# Patient Record
Sex: MALE | Race: ASIAN | NOT HISPANIC OR LATINO | Employment: OTHER | URBAN - METROPOLITAN AREA
[De-identification: names, ages, dates, MRNs, and addresses within clinical notes are randomized per-mention and may not be internally consistent; named-entity substitution may affect disease eponyms.]

---

## 2018-09-07 ENCOUNTER — RECORDS - HEALTHEAST (OUTPATIENT)
Dept: LAB | Facility: CLINIC | Age: 58
End: 2018-09-07

## 2018-09-07 LAB
ALBUMIN SERPL-MCNC: 3.7 G/DL (ref 3.5–5)
ALP SERPL-CCNC: 77 U/L (ref 45–120)
ALT SERPL W P-5'-P-CCNC: 50 U/L (ref 0–45)
ANION GAP SERPL CALCULATED.3IONS-SCNC: 10 MMOL/L (ref 5–18)
AST SERPL W P-5'-P-CCNC: 20 U/L (ref 0–40)
BILIRUB SERPL-MCNC: 0.8 MG/DL (ref 0–1)
BUN SERPL-MCNC: 24 MG/DL (ref 8–22)
CALCIUM SERPL-MCNC: 9.8 MG/DL (ref 8.5–10.5)
CHLORIDE BLD-SCNC: 105 MMOL/L (ref 98–107)
CHOLEST SERPL-MCNC: 278 MG/DL
CO2 SERPL-SCNC: 27 MMOL/L (ref 22–31)
CREAT SERPL-MCNC: 0.79 MG/DL (ref 0.7–1.3)
CREAT UR-MCNC: 54.6 MG/DL
FASTING STATUS PATIENT QL REPORTED: YES
GFR SERPL CREATININE-BSD FRML MDRD: >60 ML/MIN/1.73M2
GLUCOSE BLD-MCNC: 127 MG/DL (ref 70–125)
HDLC SERPL-MCNC: 57 MG/DL
LDLC SERPL CALC-MCNC: 198 MG/DL
MICROALBUMIN UR-MCNC: 0.55 MG/DL (ref 0–1.99)
MICROALBUMIN/CREAT UR: 10.1 MG/G
POTASSIUM BLD-SCNC: 5 MMOL/L (ref 3.5–5)
PROT SERPL-MCNC: 7 G/DL (ref 6–8)
SODIUM SERPL-SCNC: 142 MMOL/L (ref 136–145)
TRIGL SERPL-MCNC: 113 MG/DL

## 2019-02-18 ENCOUNTER — RECORDS - HEALTHEAST (OUTPATIENT)
Dept: ADMINISTRATIVE | Facility: OTHER | Age: 59
End: 2019-02-18

## 2019-04-22 ENCOUNTER — RECORDS - HEALTHEAST (OUTPATIENT)
Dept: ADMINISTRATIVE | Facility: OTHER | Age: 59
End: 2019-04-22

## 2019-08-14 ENCOUNTER — RECORDS - HEALTHEAST (OUTPATIENT)
Dept: ADMINISTRATIVE | Facility: OTHER | Age: 59
End: 2019-08-14

## 2019-08-15 ENCOUNTER — COMMUNICATION - HEALTHEAST (OUTPATIENT)
Dept: PULMONOLOGY | Facility: OTHER | Age: 59
End: 2019-08-15

## 2019-08-15 ENCOUNTER — AMBULATORY - HEALTHEAST (OUTPATIENT)
Dept: PULMONOLOGY | Facility: OTHER | Age: 59
End: 2019-08-15

## 2019-08-15 DIAGNOSIS — R06.02 SOB (SHORTNESS OF BREATH): ICD-10-CM

## 2019-09-26 RX ORDER — CETIRIZINE HYDROCHLORIDE 10 MG/1
10 TABLET ORAL DAILY
Status: SHIPPED | COMMUNITY
Start: 2019-09-26

## 2019-09-26 RX ORDER — ALBUTEROL SULFATE 90 UG/1
2 AEROSOL, METERED RESPIRATORY (INHALATION) EVERY 4 HOURS PRN
Status: SHIPPED | COMMUNITY
Start: 2019-09-26

## 2019-10-02 ENCOUNTER — RECORDS - HEALTHEAST (OUTPATIENT)
Dept: PULMONOLOGY | Facility: OTHER | Age: 59
End: 2019-10-02

## 2019-10-02 ENCOUNTER — RECORDS - HEALTHEAST (OUTPATIENT)
Dept: ADMINISTRATIVE | Facility: OTHER | Age: 59
End: 2019-10-02

## 2019-10-02 ENCOUNTER — OFFICE VISIT - HEALTHEAST (OUTPATIENT)
Dept: PULMONOLOGY | Facility: OTHER | Age: 59
End: 2019-10-02

## 2019-10-02 DIAGNOSIS — J45.20 MILD INTERMITTENT ASTHMA WITHOUT COMPLICATION: ICD-10-CM

## 2019-10-02 DIAGNOSIS — J30.9 ALLERGIC RHINITIS, UNSPECIFIED SEASONALITY, UNSPECIFIED TRIGGER: ICD-10-CM

## 2019-10-02 DIAGNOSIS — R06.02 SHORTNESS OF BREATH: ICD-10-CM

## 2019-10-02 DIAGNOSIS — R06.09 DYSPNEA ON EXERTION: ICD-10-CM

## 2019-10-02 ASSESSMENT — MIFFLIN-ST. JEOR: SCORE: 1355.57

## 2019-10-25 ENCOUNTER — RECORDS - HEALTHEAST (OUTPATIENT)
Dept: LAB | Facility: CLINIC | Age: 59
End: 2019-10-25

## 2019-10-25 LAB
ALBUMIN SERPL-MCNC: 3.7 G/DL (ref 3.5–5)
ALP SERPL-CCNC: 87 U/L (ref 45–120)
ALT SERPL W P-5'-P-CCNC: 32 U/L (ref 0–45)
ANION GAP SERPL CALCULATED.3IONS-SCNC: 10 MMOL/L (ref 5–18)
AST SERPL W P-5'-P-CCNC: 19 U/L (ref 0–40)
BILIRUB SERPL-MCNC: 0.9 MG/DL (ref 0–1)
BUN SERPL-MCNC: 21 MG/DL (ref 8–22)
CALCIUM SERPL-MCNC: 9.1 MG/DL (ref 8.5–10.5)
CHLORIDE BLD-SCNC: 106 MMOL/L (ref 98–107)
CHOLEST SERPL-MCNC: 198 MG/DL
CO2 SERPL-SCNC: 24 MMOL/L (ref 22–31)
CREAT SERPL-MCNC: 0.81 MG/DL (ref 0.7–1.3)
FASTING STATUS PATIENT QL REPORTED: ABNORMAL
GFR SERPL CREATININE-BSD FRML MDRD: >60 ML/MIN/1.73M2
GLUCOSE BLD-MCNC: 114 MG/DL (ref 70–125)
HDLC SERPL-MCNC: 42 MG/DL
LDLC SERPL CALC-MCNC: 120 MG/DL
POTASSIUM BLD-SCNC: 3.8 MMOL/L (ref 3.5–5)
PROT SERPL-MCNC: 6.9 G/DL (ref 6–8)
SODIUM SERPL-SCNC: 140 MMOL/L (ref 136–145)
TRIGL SERPL-MCNC: 180 MG/DL

## 2020-05-11 ENCOUNTER — RECORDS - HEALTHEAST (OUTPATIENT)
Dept: MEDSURG UNIT | Facility: CLINIC | Age: 60
End: 2020-05-11

## 2020-05-11 ENCOUNTER — ANESTHESIA - HEALTHEAST (OUTPATIENT)
Dept: MEDSURG UNIT | Facility: CLINIC | Age: 60
End: 2020-05-11

## 2021-06-01 NOTE — PROGRESS NOTES
[facility] Pulmonary Clinic Visit    Problems Addressed Today  Problem List Items Addressed This Visit     None      Visit Diagnoses     Dyspnea on exertion    -  Primary    Mild intermittent asthma without complication        Relevant Medications    cetirizine (ZYRTEC) 10 MG tablet    albuterol (PROAIR HFA;PROVENTIL HFA;VENTOLIN HFA) 90 mcg/actuation inhaler    Allergic rhinitis, unspecified seasonality, unspecified trigger        Relevant Medications    cetirizine (ZYRTEC) 10 MG tablet    meloxicam (MOBIC) 15 MG tablet    albuterol (PROAIR HFA;PROVENTIL HFA;VENTOLIN HFA) 90 mcg/actuation inhaler        Assessment:58M with diabetes here with shortness of breath, exertional that improves with ventolin.  This could represent deconditioning or exercise induced asthma.    Plans and recommendations:   Mild exercise induced asthma: continue ventolin as needed.  Treat underlying allergies. Action plan given, follow up as needed.    He will need to be observed for signs/symptoms concerning for cardiac issues given his risk factors.    New problem with no further workup  Old problem stable.    Chief Complaint:shortness of breath    HPI:Shortness of breath.  Started 1 year ago.  It is worse with exertion.  It improves with rest.  Localizes to the chest.  No other provocative or palliative factors.  No sputum production, no hemoptysis, no reflux.  He does have sinus symptoms and he takes a medication which helps.    It is only associated with heavy exertion - going up stairs carrying things.    He denies chest pain, heaviness, weight.  No diaphoresis or dizziness.    Current Outpatient Medications on File Prior to Visit   Medication Sig Dispense Refill     acetaminophen (TYLENOL) 500 MG tablet Take 500 mg by mouth every 6 (six) hours as needed.       albuterol (PROAIR HFA;PROVENTIL HFA;VENTOLIN HFA) 90 mcg/actuation inhaler Inhale 2 puffs every 6 (six) hours as needed.       amLODIPine-atorvastatin (CADUET) 5-40 mg per  "tablet Take 1 tablet by mouth daily.       aspirin 81 MG EC tablet Take 81 mg by mouth daily.       cetirizine (ZYRTEC) 10 MG tablet Take 10 mg by mouth daily.       FLUoxetine (PROZAC) 20 MG capsule Take 20 mg by mouth daily.       hydroCHLOROthiazide (HYDRODIURIL) 12.5 MG tablet Take 12.5 mg by mouth daily.       lisinopril (PRINIVIL,ZESTRIL) 40 MG tablet Take 40 mg by mouth daily.       meloxicam (MOBIC) 15 MG tablet Take 15 mg by mouth daily.       simvastatin (ZOCOR) 40 MG tablet Take 40 mg by mouth at bedtime.       traZODone (DESYREL) 50 MG tablet Take 50 mg by mouth at bedtime.       No current facility-administered medications on file prior to visit.          Medical History  There are no active non-hospital problems to display for this patient.    No past medical history on file.     Surgical History  He  has no past surgical history on file.       Social History  Reviewed, and he  reports that he has never smoked. He has never used smokeless tobacco.       Allergies  No Known Allergies Family History  Reviewed, and no asthma history or copd history.          /70   Pulse 74   Resp 12   Ht 5' 2\" (1.575 m)   Wt 148 lb (67.1 kg)   SpO2 93%   BMI 27.07 kg/m    General: calm, normal body habitus  Eyes: no scleral injection or icterus, pupils equal and round  Nose: patent nares  Mouth: oropharynx benign, MP3  Neck: supple, no lymph nodes  Chest: nontender to palpation, no deformity  Lungs: clear no wheezes or crackles  CV: palpable radial pulses, RRR, no m/r/g, normal PMI  Abd: soft, nontender  Ext: no clubbing, no ulnar deviation of digits  Neuro: alert and interactive, no tremor or abnormal movements  Pscyh: normal affect appropriate to clinical scenario, no hallucination      Data Review - imaging, labs, and ekgs listed below were reviewed by me.  Chest XRay and chest CT images and EKG tracings interpreted personally.     Past Labs  Ancillary Procedure on 10/02/2019   Component Date Value     Hgb " 10/02/2019 15.5        * Cannot find OR log *    PFTs reviewed and interpreted by me:  Normal spirometry. Normal lung volumes, normal diffusion capacity.      Outside records reviewed and interpreted by me:  Shortness of breath with exertional cause. Allergies are an issue but respond to antihistamine. No spirometry but does respond to ventolin.  Has diabetes that is well controlled, hypertension.

## 2021-06-03 VITALS
HEART RATE: 74 BPM | RESPIRATION RATE: 12 BRPM | HEIGHT: 62 IN | DIASTOLIC BLOOD PRESSURE: 70 MMHG | BODY MASS INDEX: 27.23 KG/M2 | OXYGEN SATURATION: 93 % | WEIGHT: 148 LBS | SYSTOLIC BLOOD PRESSURE: 110 MMHG

## 2021-06-08 NOTE — ANESTHESIA PROCEDURE NOTES
Emergent Intubation    Date/Time: 5/11/2020 9:05 AM    CRNA: Bisi Glynn CRNA  Indications: respiratory distress    Medications Administered  PropofoL injection (DIPRIVAN), 50 mgMedication administration time:5/11/2020 9:05 AMRoute: oral  Technique: direct  Tube size: 8.5 mm  Tube type: cuffed  Cuff inflated: yes  Level of Difficulty: 0ETT to lip: 23 (tube advanced from 22 to 23 cm) cm  Tube secured with: ETT olivares  ETCO2 = Yes  Breath sounds: equal  SaO2 %: 96    Sign out given. CXR and sedation per primary care team.  Comments: Called to room for cuff leak and inadequate tidal volumes.  Upon direct visualization of ETT, tube had migrated out of place.  Cuff deflated, tube re-advanced from 22cm to 23cm under direct visualization.  Tube secured with ETT olivares and RT assistance.  Tidal volumes back to goal volumes after advancement of tube

## 2021-06-16 PROBLEM — F32.A DEPRESSION: Status: ACTIVE | Noted: 2020-06-08

## 2021-06-16 PROBLEM — R13.19 OTHER DYSPHAGIA: Status: ACTIVE | Noted: 2020-06-08

## 2021-06-16 PROBLEM — E11.9 TYPE 2 DIABETES MELLITUS WITHOUT COMPLICATION, WITHOUT LONG-TERM CURRENT USE OF INSULIN (H): Status: ACTIVE | Noted: 2020-06-08

## 2021-06-16 PROBLEM — J69.0 ASPIRATION PNEUMONIA (H): Status: ACTIVE | Noted: 2020-05-09

## 2021-06-16 PROBLEM — G37.9 DEMYELINATING DISEASE (H): Status: ACTIVE | Noted: 2020-06-19

## 2021-06-16 PROBLEM — G93.41 ACUTE METABOLIC ENCEPHALOPATHY: Status: ACTIVE | Noted: 2020-06-13

## 2021-06-16 PROBLEM — I10 ESSENTIAL HYPERTENSION: Chronic | Status: ACTIVE | Noted: 2020-06-08

## 2021-06-19 NOTE — LETTER
Letter by Steven Garcia MD at      Author: Steven Garcia MD Service: -- Author Type: --    Filed:  Encounter Date: 8/15/2019 Status: (Other)         Gabriel Colorado  [address]  [address]  [facility] [address]    August 15, 2019    Dear Mr. Colorado,    Welcome to [facility]! Your appointment information is below.   Please bring the following to your appointment:    Insurance Card, so we may scan it for our records    Drivers license or valid ID, so we may scan it for our records    Co-pay (as applicable per your insurance plan)    A current list of your medications including over the counter products such as vitamins and supplements    Your medical records including copies of X-Ray films if you are transferring your care from another clinic.  If you do not have your records, please fill out the release of information form and we will request those records.     Provider: Steven Garcia MD  Appointment Date:  Wednesday, October 2, 2019  Arrival Time:  8:00 PFT,  9:00 dr appt.    Location: [address]        [facility], [address]    **Please allow adequate time for your commute and parking. If you are more than 10 minutes late, you may be asked to reschedule.     If you need to cancel or reschedule your appointment, please notify us at least 24 hours prior to your appointment time so we are able to make this time available for another patient.    Thank you for choosing the [facility] for your health care needs. If you have any questions, please do not hesitate to contact us at any time at   [phone]. We look forward to caring for you.     Sincerely,     [facility] staff

## 2021-07-14 PROBLEM — J96.01 ACUTE RESPIRATORY FAILURE WITH HYPOXIA (H): Status: RESOLVED | Noted: 2020-05-09 | Resolved: 2020-06-19

## 2022-09-01 ENCOUNTER — TELEPHONE (OUTPATIENT)
Dept: AUDIOLOGY | Facility: CLINIC | Age: 62
End: 2022-09-01

## 2022-09-01 ENCOUNTER — TRANSFERRED RECORDS (OUTPATIENT)
Dept: HEALTH INFORMATION MANAGEMENT | Facility: CLINIC | Age: 62
End: 2022-09-01

## 2022-09-01 ENCOUNTER — TRANSCRIBE ORDERS (OUTPATIENT)
Dept: OTHER | Age: 62
End: 2022-09-01

## 2022-09-01 ENCOUNTER — MEDICAL CORRESPONDENCE (OUTPATIENT)
Dept: HEALTH INFORMATION MANAGEMENT | Facility: CLINIC | Age: 62
End: 2022-09-01

## 2022-09-01 DIAGNOSIS — H91.92 UNSPECIFIED HEARING LOSS, LEFT EAR: Primary | ICD-10-CM

## 2022-09-01 NOTE — PROGRESS NOTES
AUDIOLOGY REPORT    SUBJECTIVE: Gabriel Colorado is a 61 year old male who was seen in the Audiology Clinic at the [facility] for audiologic evaluation, referred by, Janell Mcgill MD. They were accompanied today by their wife and . Patient reports he does not hear much out of the left ear, and has some hearing loss in the right ear as well. He arrived wearing Siemens/[employer] hearing aids, fit at an outside clinic, he reports they are about 4 years old. He is interested in having the hearing aids looked over. He reports non-pulsatile tinnitus in the right ear only. Has vertigo and dizziness 2-3 times a week. He denied otalgia, otorrhea, loud noise exposure, and history of ear surgery.    OBJECTIVE:  Abuse Screening:  Do you feel unsafe at home or work/school? No  Do you feel threatened by someone? No  Does anyone try to keep you from having contact with others, or doing things outside of your home? No  Physical signs of abuse present? No     Fall Risk Screen:  1. Have you fallen two or more times in the past year? No  2. Have you fallen and had an injury in the past year? No    Timed Up and Go Score (in seconds): not tested  Is patient a fall risk? No  Referral initiated: No  Fall Risk Assessment Completed by Audiology    Otoscopic exam indicates ears are clear of cerumen bilaterally     Pure Tone Thresholds assessed using conventional audiometry with good reliability from 250-8000 Hz bilaterally using insert earphones and circumaural headphones.     RIGHT:  Moderate to moderately severe sensorineural (SNHL). Masking dilemma at 250 Hz for bone conduction.     LEFT:  Profound rising to severe mixed hearing loss.    Tympanogram:    RIGHT: Shallow mobility    LEFT:   Restricted eardrum mobility     Reflexes (reported by stimulus ear):  Did not test due to abnormal tympanograms.     Speech Detection Threshold:    RIGHT: 55 dB HL    LEFT:   90 dB HL    Word Recognition Score:     RIGHT: 90%  at 85 dB HL using 10 word Hmong list presented via .     LEFT:  0% at 90 dB HL  using 10 word Hmong list presented via .     Lila: Negative.     ASSESSMENT: Today's results reveal a moderate to moderately severe SNHL in the right ear, and a profound rising to severe mixed hearing loss in the left ear. Today s results were discussed with the patient in detail.     Patient left his hearing aids and  to be cleaned and checked. Replaced domes and vacuumed microphone ports. Unable to replace wax traps due to not having the correct wax traps in clinic. Listening check confirmed proper function of the hearing aids.     PLAN: Follow up with ENT due to asymmetric hearing loss, abnormal tympanograms, vertigo, unilateral tinnitus, and to obtain medical clearance for new hearing aids. Audiology assistnat will call Gabriel and let him the know the hearing aids are ready to be picked up. Please call this clinic with questions regarding these results or recommendations.    Ann-Marie Reed., CCC-A  [place] Licensed Audiologist #[id number]

## 2022-09-01 NOTE — TELEPHONE ENCOUNTER
Called and spoke with patient using [employer] . He has an audiology appointment for a hearing evaluation scheduled for 9/6/2022.  Let him know that his insurance requires an order/referral to be seen by audiology for this type of appointment. His PCP is not apart of [employer], unable to request an order via in basket messaging. Patient will call his PCP and request an order, provided patient with the fax number. He is aware without an order there would be an out of pocket expense for testing.     Chaim Reed, CCC-A  [place] Licensed Audiologist #[id number]

## 2022-09-06 ENCOUNTER — OFFICE VISIT (OUTPATIENT)
Dept: AUDIOLOGY | Facility: CLINIC | Age: 62
End: 2022-09-06
Attending: FAMILY MEDICINE
Payer: MEDICARE

## 2022-09-06 DIAGNOSIS — H91.92 UNSPECIFIED HEARING LOSS, LEFT EAR: ICD-10-CM

## 2022-09-06 DIAGNOSIS — H90.A21 SENSORINEURAL HEARING LOSS (SNHL) OF RIGHT EAR WITH RESTRICTED HEARING OF LEFT EAR: Primary | ICD-10-CM

## 2022-09-06 PROCEDURE — 92567 TYMPANOMETRY: CPT | Performed by: AUDIOLOGIST

## 2022-09-06 PROCEDURE — 92593 PR HEARING AID CHECK, BINAURAL: CPT | Performed by: AUDIOLOGIST

## 2022-09-06 PROCEDURE — 92565 STENGER TEST PURE TONE: CPT | Performed by: AUDIOLOGIST

## 2022-09-06 PROCEDURE — 92557 COMPREHENSIVE HEARING TEST: CPT | Performed by: AUDIOLOGIST

## 2022-10-21 ENCOUNTER — TELEPHONE (OUTPATIENT)
Dept: AUDIOLOGY | Facility: CLINIC | Age: 62
End: 2022-10-21

## 2022-10-21 NOTE — TELEPHONE ENCOUNTER
Patient saw Coral 9/6 and left his hearing aids to be looked at, he has not gotten a call that they are ready.  Please call patient and advise when he can  his hearing aids.    Thanks!

## 2022-10-25 ENCOUNTER — TELEPHONE (OUTPATIENT)
Dept: AUDIOLOGY | Facility: CLINIC | Age: 62
End: 2022-10-25

## 2022-10-25 NOTE — TELEPHONE ENCOUNTER
Called patient via  to let them know their hearing aids are ready for pickup at the [facility], hours were provided.    Patient was previously called on 9/6/22 that the hearing aids were ready for pickup. Gabriel stated that they will come collect them at the clinic today.     Judith Martinez, Audiology Clinic Assistant

## 2023-02-10 ENCOUNTER — OFFICE VISIT (OUTPATIENT)
Dept: OTOLARYNGOLOGY | Facility: CLINIC | Age: 63
End: 2023-02-10
Payer: MEDICARE

## 2023-02-10 DIAGNOSIS — Z46.1 HEARING AID FITTING OR ADJUSTMENT: Primary | ICD-10-CM

## 2023-02-10 NOTE — LETTER
2/10/2023         RE: Gabriel Colorado  [address] [place] [address]        Dear Colleague,    Thank you for referring your patient, Gabriel Colorado, to the [facility]. Please see a copy of my visit note below.    VISIT CANCELLED        Again, thank you for allowing me to participate in the care of your patient.        Sincerely,        Elbert Roberson MD

## 2023-03-31 NOTE — PROGRESS NOTES
AUDIOLOGY REPORT    SUBJECTIVE: Gabriel Colorado is a 62 year old male was seen in the Audiology Clinic at  [facility] on 4/10/23 to discuss concerns with hearing and functional communication difficulties. The patient was accompanied by their wife and an  today. Gabriel has been seen previously on 09/06/2022 and results revealed a moderate to moderately severe sensorineural hearing loss in the right ear, and a profound rising to severe mixed hearing loss in the left ear. The patient has not yet been medically cleared for hearing aids, he has an ENT appointment scheduled for 06/09/2023. His previous ENT appointment in February was cancelled in error. He would like to hear better at [employer] and at meetings. Patient currently wears binaural Siemens Cellion SHAWANDA style hearing aids, fit at an outside clinic.     OBJECTIVE:  Abuse Screening:  Do you feel unsafe at home or work/school? No  Do you feel threatened by someone? No  Does anyone try to keep you from having contact with others, or doing things outside of your home? No  Physical signs of abuse present? No    Patient is a hearing aid candidate. Patient would like to move forward with a hearing aid evaluation today. Therefore, the patient was presented with different options for amplification to help aid in communication. Discussed styles, levels of technology and monaural vs. binaural fitting.     Discussed the purpose of the CROS type hearing aid in detail. Discussed severity of patient's hearing loss and realistic expectations. Discussed attending the ENT appointment on 6/9/2023 for medical clearance, he is aware he will not be fit with hearing aids if he does not have medical clearance.       The hearing aid(s) mutually chosen were:  Binaural: Phonak Left: CROS P-R Right: [place] P-R 70  COLOR: Beige  BATTERY SIZE: rechargeable  EARMOLD/TIPS: Right cShell, Left: open dome  CANAL/ LENGTH: 1    Otoscopy revealed ears are clear of  cerumen. A right earmold was taken without incident.    ASSESSMENT:   Reviewed purchase information and warranty information with patient. The 45 day trial period was explained to patient. The patient was given a copy of the [place] [employer] consumer brochure on purchasing hearing instruments. Patient risk factors have been provided to the patient in writing prior to the sale of the hearing aid per FDA regulation. The risk factors are also available in the User Instructional Booklet to be presented on the day of the hearing aid fitting. Hearing aid(s) ordered. Hearing aid evaluation completed.    PLAN: Gabriel is scheduled to return on 06/27/2023 for a hearing aid fitting and programming appointment. ENT appointment on 06/09/2023 for medical clearance for hearing aids. Purchase agreement will be completed on that date. Please contact this clinic with any questions or concerns.      Chaim Reed, CCC-A  [place] Licensed Audiologist #[id number]

## 2023-04-10 ENCOUNTER — OFFICE VISIT (OUTPATIENT)
Dept: AUDIOLOGY | Facility: CLINIC | Age: 63
End: 2023-04-10
Payer: MEDICAID

## 2023-04-10 DIAGNOSIS — H90.A21 SENSORINEURAL HEARING LOSS (SNHL) OF RIGHT EAR WITH RESTRICTED HEARING OF LEFT EAR: Primary | ICD-10-CM

## 2023-04-10 PROCEDURE — V5275 EAR IMPRESSION: HCPCS | Mod: RT | Performed by: AUDIOLOGIST

## 2023-04-10 PROCEDURE — 92590 PR HEARING AID EXAM MONAURAL: CPT | Performed by: AUDIOLOGIST

## 2023-06-09 ENCOUNTER — OFFICE VISIT (OUTPATIENT)
Dept: OTOLARYNGOLOGY | Facility: CLINIC | Age: 63
End: 2023-06-09
Payer: MEDICARE

## 2023-06-09 DIAGNOSIS — H90.3 SENSORINEURAL HEARING LOSS (SNHL) OF BOTH EARS: Primary | ICD-10-CM

## 2023-06-09 PROCEDURE — 99203 OFFICE O/P NEW LOW 30 MIN: CPT | Performed by: OTOLARYNGOLOGY

## 2023-06-09 NOTE — LETTER
"    6/9/2023         RE: Gabriel Colorado  [address] [place] [address]        Dear Colleague,    Thank you for referring your patient, Gabriel Colorado, to the [facility]. Please see a copy of my visit note below.    CHIEF COMPLAINT: Patient presents with:  Hearing Problem: Pt needs medical clearance for hearing aids          HISTORY OF PRESENT ILLNESS    Gabriel was seen at the behest of Janell Mcgill MD for hearing loss.  No dizziness.  He was \"waterfall\" tinnitus in the right ear.  Current set of hearing aids 4 years old ([employer]).  He presents for medical clearance.     AUDIOLOGY NOTE: (6 months prior)     and patient s wife present. Patient reports he does not hear much out of the left ear, and has some hearing loss in the right  ear as well. He arrived wearing Siemens/Remit[employer] SHWAANDA hearing aids fit at an outside clinic, he reports they are about 4 years old. He is  interested in having the hearing aids looked over. He reports non-pulsatile tinnitus in the right ear only. Has vertigo and dizziness 2-3 times  a week. He denied otalgia, otorrhea, loud noise exposure, and history of ear surgery.  RESULTS: Otoscopy: Clear canals bilat. Tymps: Right: Shallow mobility. Left: Restricted eardrum mobility. Reflexes: DNT due to abnormal  tymps. Audio: Right: Moderate to moderately severe SNHL. Masking dilemma at 250 Hz for bone. Left: Profound rising to severe mixed  hearing loss. Word rec: 10 word Hmong list presented via . Right: 9/10. Left: 0/10. Negative Lila at multiple frequencies.  REC: Follow up with ENT due to asymmetric hearing loss, abnormal tympanograms, vertigo, unilateral tinnitus, and to obtain medical  clearance for new hearing aids. Patient left hearing aids to be cleaned and checked. Will call patient when ready for .     REVIEW OF SYSTEMS    Review of Systems as per HPI and PMHx, otherwise 10 system review system are negative. "       ALLERGIES    Vancomycin    CURRENT MEDICATIONS      Current Outpatient Medications:      albuterol (PROAIR HFA;PROVENTIL HFA;VENTOLIN HFA) 90 mcg/actuation inhaler, [ALBUTEROL (PROAIR HFA;PROVENTIL HFA;VENTOLIN HFA) 90 MCG/ACTUATION INHALER] Inhale 2 puffs every 4 (four) hours as needed. , Disp: , Rfl:      amLODIPine-benazepril (LOTREL) 5-40 mg per capsule, [AMLODIPINE-BENAZEPRIL (LOTREL) 5-40 MG PER CAPSULE] Take 1 capsule by mouth daily., Disp: , Rfl:      ARTHRITIS PAIN RELIEF, ACETAM, 650 mg CR tablet, [ARTHRITIS PAIN RELIEF, ACETAM, 650 MG CR TABLET] Take 650 mg by mouth every 8 (eight) hours as needed., Disp: , Rfl:      aspirin 81 MG EC tablet, [ASPIRIN 81 MG EC TABLET] Take 81 mg by mouth daily., Disp: , Rfl:      atorvastatin (LIPITOR) 40 MG tablet, [ATORVASTATIN (LIPITOR) 40 MG TABLET] Take 1 tablet (40 mg total) by mouth every evening., Disp: , Rfl: 0     cetirizine (ZYRTEC) 10 MG tablet, [CETIRIZINE (ZYRTEC) 10 MG TABLET] Take 10 mg by mouth daily., Disp: , Rfl:      metFORMIN (GLUCOPHAGE-XR) 500 MG 24 hr tablet, [METFORMIN (GLUCOPHAGE-XR) 500 MG 24 HR TABLET] Take 500 mg by mouth daily., Disp: , Rfl:      traZODone (DESYREL) 100 MG tablet, [TRAZODONE (DESYREL) 100 MG TABLET] Take 1 tablet (100 mg total) by mouth at bedtime as needed for sleep., Disp: , Rfl: 0     VITAMIN D3 50 mcg (2,000 unit) capsule, [VITAMIN D3 50 MCG (2,000 UNIT) CAPSULE] Take 2,000 Units by mouth daily., Disp: , Rfl:      PAST MEDICAL HISTORY    PAST MEDICAL HISTORY:   Past Medical History:   Diagnosis Date     Asthma      Depression      HTN (hypertension)        PAST SURGICAL HISTORY    PAST SURGICAL HISTORY:   Past Surgical History:   Procedure Laterality Date     IR LUMBAR PUNCTURE  6/14/2020     IR SPINAL PUNCTURE  6/14/2020     PICC INSERTION - TRIPLE LUMEN  5/9/2020            FAMILY  HISTORY    FAMILY HISTORY: No family history on file.    SOCIAL HISTORY    SOCIAL HISTORY:   Social History     Tobacco Use     Smoking  status: Not on file     Smokeless tobacco: Not on file   Vaping Use     Vaping status: Not on file   Substance Use Topics     Alcohol use: Not on file        PHYSICAL EXAM    HEAD: Normal appearance and symmetry:  No cutaneous lesions.      NECK:  supple     EARS:    Right:   TM intact   LEFT:   TM intact    EYES:  EOMI    CN VII/XII:  intact     NOSE:     Dorsum:   straight  Septum:  midline  Mucosa:  moist        ORAL CAVITY/OROPHARYNX:     Lips:  Normal.  Tongue: normal, midline  Mucosa:   no lesions     NECK:  Trachea:  midline.              Thyroid:  normal              Adenopathy:  none        NEURO:   Alert and Oriented     GAIT AND STATION:  normal     RESPIRATORY:   Symmetry and Respiratory effort     PSYCH:  Normal mood and affect     SKIN:   warm and dry     AUDIOGRAM:  Non serviceable hearing left ear  Profound SNHL right.     IMPRESSION:    Encounter Diagnosis   Name Primary?     Sensorineural hearing loss (SNHL) of both ears Yes          RECOMMENDATIONS:      This patient is medically cleared for hearing aids  Return annually for hearing test  Return immediately for any sudden change in hearing        Again, thank you for allowing me to participate in the care of your patient.        Sincerely,        Elbert Roberson MD

## 2023-06-09 NOTE — PROGRESS NOTES
"CHIEF COMPLAINT: Patient presents with:  Hearing Problem: Pt needs medical clearance for hearing aids          HISTORY OF PRESENT ILLNESS    Gabriel was seen at the behest of Janell Mcgill MD for hearing loss.  No dizziness.  He was \"waterfall\" tinnitus in the right ear.  Current set of hearing aids 4 years old ([employer]).  He presents for medical clearance.     AUDIOLOGY NOTE: (6 months prior)     and patient s wife present. Patient reports he does not hear much out of the left ear, and has some hearing loss in the right  ear as well. He arrived wearing Siemens/PerkHub HSAWANDA hearing aids fit at an outside clinic, he reports they are about 4 years old. He is  interested in having the hearing aids looked over. He reports non-pulsatile tinnitus in the right ear only. Has vertigo and dizziness 2-3 times  a week. He denied otalgia, otorrhea, loud noise exposure, and history of ear surgery.  RESULTS: Otoscopy: Clear canals bilat. Tymps: Right: Shallow mobility. Left: Restricted eardrum mobility. Reflexes: DNT due to abnormal  tymps. Audio: Right: Moderate to moderately severe SNHL. Masking dilemma at 250 Hz for bone. Left: Profound rising to severe mixed  hearing loss. Word rec: 10 word Hmong list presented via . Right: 9/10. Left: 0/10. Negative Lila at multiple frequencies.  REC: Follow up with ENT due to asymmetric hearing loss, abnormal tympanograms, vertigo, unilateral tinnitus, and to obtain medical  clearance for new hearing aids. Patient left hearing aids to be cleaned and checked. Will call patient when ready for .     REVIEW OF SYSTEMS    Review of Systems as per HPI and PMHx, otherwise 10 system review system are negative.       ALLERGIES    Vancomycin    CURRENT MEDICATIONS      Current Outpatient Medications:      albuterol (PROAIR HFA;PROVENTIL HFA;VENTOLIN HFA) 90 mcg/actuation inhaler, [ALBUTEROL (PROAIR HFA;PROVENTIL HFA;VENTOLIN HFA) 90 MCG/ACTUATION INHALER] Inhale 2 puffs every 4 " (four) hours as needed. , Disp: , Rfl:      amLODIPine-benazepril (LOTREL) 5-40 mg per capsule, [AMLODIPINE-BENAZEPRIL (LOTREL) 5-40 MG PER CAPSULE] Take 1 capsule by mouth daily., Disp: , Rfl:      ARTHRITIS PAIN RELIEF, ACETAM, 650 mg CR tablet, [ARTHRITIS PAIN RELIEF, ACETAM, 650 MG CR TABLET] Take 650 mg by mouth every 8 (eight) hours as needed., Disp: , Rfl:      aspirin 81 MG EC tablet, [ASPIRIN 81 MG EC TABLET] Take 81 mg by mouth daily., Disp: , Rfl:      atorvastatin (LIPITOR) 40 MG tablet, [ATORVASTATIN (LIPITOR) 40 MG TABLET] Take 1 tablet (40 mg total) by mouth every evening., Disp: , Rfl: 0     cetirizine (ZYRTEC) 10 MG tablet, [CETIRIZINE (ZYRTEC) 10 MG TABLET] Take 10 mg by mouth daily., Disp: , Rfl:      metFORMIN (GLUCOPHAGE-XR) 500 MG 24 hr tablet, [METFORMIN (GLUCOPHAGE-XR) 500 MG 24 HR TABLET] Take 500 mg by mouth daily., Disp: , Rfl:      traZODone (DESYREL) 100 MG tablet, [TRAZODONE (DESYREL) 100 MG TABLET] Take 1 tablet (100 mg total) by mouth at bedtime as needed for sleep., Disp: , Rfl: 0     VITAMIN D3 50 mcg (2,000 unit) capsule, [VITAMIN D3 50 MCG (2,000 UNIT) CAPSULE] Take 2,000 Units by mouth daily., Disp: , Rfl:      PAST MEDICAL HISTORY    PAST MEDICAL HISTORY:   Past Medical History:   Diagnosis Date     Asthma      Depression      HTN (hypertension)        PAST SURGICAL HISTORY    PAST SURGICAL HISTORY:   Past Surgical History:   Procedure Laterality Date     IR LUMBAR PUNCTURE  6/14/2020     IR SPINAL PUNCTURE  6/14/2020     PICC INSERTION - TRIPLE LUMEN  5/9/2020            FAMILY  HISTORY    FAMILY HISTORY: No family history on file.    SOCIAL HISTORY    SOCIAL HISTORY:   Social History     Tobacco Use     Smoking status: Not on file     Smokeless tobacco: Not on file   Vaping Use     Vaping status: Not on file   Substance Use Topics     Alcohol use: Not on file        PHYSICAL EXAM    HEAD: Normal appearance and symmetry:  No cutaneous lesions.      NECK:  supple     EARS:     Right:   TM intact   LEFT:   TM intact    EYES:  EOMI    CN VII/XII:  intact     NOSE:     Dorsum:   straight  Septum:  midline  Mucosa:  moist        ORAL CAVITY/OROPHARYNX:     Lips:  Normal.  Tongue: normal, midline  Mucosa:   no lesions     NECK:  Trachea:  midline.              Thyroid:  normal              Adenopathy:  none        NEURO:   Alert and Oriented     GAIT AND STATION:  normal     RESPIRATORY:   Symmetry and Respiratory effort     PSYCH:  Normal mood and affect     SKIN:   warm and dry     AUDIOGRAM:  Non serviceable hearing left ear  Profound SNHL right.     IMPRESSION:    Encounter Diagnosis   Name Primary?     Sensorineural hearing loss (SNHL) of both ears Yes          RECOMMENDATIONS:      This patient is medically cleared for hearing aids  Return annually for hearing test  Return immediately for any sudden change in hearing

## 2023-06-21 NOTE — PROGRESS NOTES
AUDIOLOGY REPORT    SUBJECTIVE: Gabriel Colorado, a 62 year old male, was seen in the Audiology Clinic at [facility] today for a Binaural hearing aid fitting. He was accompanied by his wife and an  at the visit today. Gabriel last had his hearing assessed at this clinic on 09/06/22 and results revealed moderate to moderately-severe sensorineural hearing loss in the right ear and profound rising to severe mixed hearing loss in the left ear. Gabriel was previously fit with a pair of Siemens/[employer] SHAWANDA hearing aids at an outside clinic. The patient was given medical clearance to pursue amplification by  Elbert Roberson M.D. on 6/9/23.      OBJECTIVE:  Prior to fitting, a hearing aid check was performed to ensure device functionality. The hearing aid conformity evaluation was completed.The hearing aids were placed and they provided a good fit. Real-ear-probe-microphone measurements were completed on the [employer] system and were a good match to NAL-NL2 target with soft sounds audible, moderate sounds comfortable, and loud sounds below discomfort. UCLs are verified through maximum power output measures and demonstrate appropriate limiting of loud inputs. Mr. Colorado was oriented to proper hearing aid use, care, cleaning (no water, dry brush), batteries (rechargeable, toxicity, low-battery signal), aid insertion/removal, user booklet, warranty information, storage cases, and other hearing aid details. The patient confirmed understanding of hearing aid use and care, and showed proper insertion of hearing aid and batteries while in the office today. Mr. Colorado reported good volume and sound quality today.    EAR(S) FIT: Binaural  MA HEARING AID MAKE: Right: Phonak Audeo P70-R, beige; Left: Phonak Cros P-R, beige    MA HEARING AID MODEL #: Right: [id number]; Left: [id number]  HEARING AID STYLE: Right: SHAWANDA; Left: SHAWANDA    LENGTH: Right:  1P; Left:  1C  EARMOLDS: Right: cShell: [id number]; Left: small  open dome with retention cord    SERIAL NUMBERS: Right: [id number]; Left: [id number]  WARRANTY END DATE: Right: 9/3/2026; Left:: 9/3/2026    Speech Intelligibility Index (SII)  The speech intelligibility index (SII) estimates the amount of audible speech at different presentation levels through the hearing aids. The following SII values represent audibility for average inputs:  Unaided SII:    3(R)  Aided SII:        44(R)    The overall gain of the right hearing aid is set to 100% for patient comfort. SoundRecover is disabled. Manual volume control is enabled on the right hearing aid. Manual CROS balance is enabled on the left CROS device. Gabriel is shown how to use both the volume control and CROS balance.     The hearing aids have not yet been paired to a phone. Gabriel hasn't been shown how to change the wax trap on the right hearing aid yet.       (The patient Does Not Require a signed a waiver that is on file agreeing to the upgrade charge, per their insurance contract. )        ASSESSMENT: Right Phonak Audeo P70-R and left Phonak CROS P-R hearing aid fitting completed today. Verification measures were performed. The 45 day trial period was explained to patient, and they expressed understanding. Mr. Colorado signed the Hearing Aid Purchase Agreement and was given a copy, as well as details on his hearing aids. Patient was counseled that exact out of pocket amounts cannot be determined for hearing aid claims being sent to insurance. Any insurance coverage information presented to the patient is an estimate only, and is not a guarantee of payment. Patient has been advised to check with their own insurance.    PLAN: Mr. Colorado will return for follow-up in 3 weeks for a hearing aid review appointment. Gabriel should be shown how to change the wax trap on his right hearing aid at the follow-up appointment. His hearing aid may also be paired to his phone at that time if he is interested. Please call this clinic with questions  regarding today s appointment.    Chaim Combs, [facility]  [place] Licensed Audiologist #[id number]

## 2023-06-27 ENCOUNTER — OFFICE VISIT (OUTPATIENT)
Dept: AUDIOLOGY | Facility: CLINIC | Age: 63
End: 2023-06-27
Payer: MEDICARE

## 2023-06-27 DIAGNOSIS — H90.A21 SENSORINEURAL HEARING LOSS (SNHL) OF RIGHT EAR WITH RESTRICTED HEARING OF LEFT EAR: ICD-10-CM

## 2023-06-27 DIAGNOSIS — H90.A32 MIXED CONDUCTIVE AND SENSORINEURAL HEARING LOSS OF LEFT EAR WITH RESTRICTED HEARING OF RIGHT EAR: ICD-10-CM

## 2023-06-27 PROCEDURE — 92592 PR HEARING AID CHECK, MONAURAL: CPT | Performed by: AUDIOLOGIST

## 2023-06-27 PROCEDURE — V5221 HEARING AID BINAURAL BTE/BTE: HCPCS | Mod: GY | Performed by: AUDIOLOGIST

## 2023-06-27 PROCEDURE — V5020 CONFORMITY EVALUATION: HCPCS | Performed by: AUDIOLOGIST

## 2023-06-27 PROCEDURE — V5240 DISP FEE CONTRALATERAL BINAU: HCPCS | Performed by: AUDIOLOGIST

## 2023-06-27 PROCEDURE — V5264 EAR MOLD/INSERT: HCPCS | Mod: RT | Performed by: AUDIOLOGIST

## 2023-06-27 PROCEDURE — V5011 HEARING AID FITTING/CHECKING: HCPCS | Performed by: AUDIOLOGIST

## 2024-06-07 NOTE — PROGRESS NOTES
AUDIOLOGY REPORT     SUMMARY: Audiology visit completed. See audiogram for results.       RECOMMENDATIONS: Follow-up with ENT.    hCaim Wild, [facility]  [place] Licensed Audiologist #[id number]

## 2024-06-10 ENCOUNTER — OFFICE VISIT (OUTPATIENT)
Dept: OTOLARYNGOLOGY | Facility: CLINIC | Age: 64
End: 2024-06-10
Payer: MEDICARE

## 2024-06-10 ENCOUNTER — OFFICE VISIT (OUTPATIENT)
Dept: AUDIOLOGY | Facility: CLINIC | Age: 64
End: 2024-06-10
Payer: MEDICARE

## 2024-06-10 DIAGNOSIS — H90.3 SNHL (SENSORY-NEURAL HEARING LOSS), ASYMMETRICAL: Primary | ICD-10-CM

## 2024-06-10 DIAGNOSIS — H90.A21 SENSORINEURAL HEARING LOSS (SNHL) OF RIGHT EAR WITH RESTRICTED HEARING OF LEFT EAR: Primary | ICD-10-CM

## 2024-06-10 DIAGNOSIS — H91.92 UNSPECIFIED HEARING LOSS, LEFT EAR: ICD-10-CM

## 2024-06-10 PROCEDURE — 92550 TYMPANOMETRY & REFLEX THRESH: CPT | Performed by: AUDIOLOGIST

## 2024-06-10 PROCEDURE — 92557 COMPREHENSIVE HEARING TEST: CPT | Mod: 52 | Performed by: AUDIOLOGIST

## 2024-06-10 PROCEDURE — 99214 OFFICE O/P EST MOD 30 MIN: CPT | Performed by: OTOLARYNGOLOGY

## 2024-06-10 NOTE — LETTER
6/10/2024      Gabriel Colorado  Please Check Full Address Mail Returned  As Undeliverable   [address] [place] [address]      Dear Colleague,    Thank you for referring your patient, Gabriel Colorado, to the [facility]. Please see a copy of my visit note below.    FOLLOW UP VISIT NOTE      HISTORY OF PRESENT ILLNESS    Gabriel was seen in follow up after previous 6/9/2023 visit for audiology review.  Patient has noted decrease in hearing in the right ear over that past few years.   He has difficulty in large open spaeces (such as [employer]), where the sounds reverberate.   He wears hearing aids dispensed by our clinic that are approximately 1 year old.         REVIEW OF SYSTEMS    Review of Systems: a 10-system review is reviewed at this encounter.  See scanned document.       Allergies   Allergen Reactions     Vancomycin Rash           PHYSICAL EXAM:        HEAD: Normal appearance and symmetry:  No cutaneous lesions.      EARS:        RIGHT: TM intact   LEFT:   TM intact  NOSE:    Dorsum:   straight       ORAL CAVITY/OROPHARYNX:    Lips:  Normal.     NECK:  Trachea:  midline     NEURO:   Alert and Oriented    GAIT AND STATION:  normal     RESPIRATORY:   Symmetry and Respiratory effort    PSYCH:   normal mood and affect    SKIN:  warm and dry     AUDIOGRAM: slightly worse tresholds RIGHT ear with 27% WR (previously 90%)      IMPRESSION:   Encounter Diagnosis   Name Primary?     SNHL (sensory-neural hearing loss), asymmetrical Yes            RECOMMENDATIONS:    Refer to audiology for hearing aid adjustment  Recheck hearing in 6 months.   Discussed option of Cochlear implantation with patient, he will take it under consideration.   Counseling time 15min/25 minute visit.       Again, thank you for allowing me to participate in the care of your patient.        Sincerely,        Elbert Roberson MD

## 2024-06-10 NOTE — PROGRESS NOTES
FOLLOW UP VISIT NOTE      HISTORY OF PRESENT ILLNESS    Gabriel was seen in follow up after previous 6/9/2023 visit for audiology review.  Patient has noted decrease in hearing in the right ear over that past few years.   He has difficulty in large open spaeces (such as [employer]), where the sounds reverberate.   He wears hearing aids dispensed by our clinic that are approximately 1 year old.         REVIEW OF SYSTEMS    Review of Systems: a 10-system review is reviewed at this encounter.  See scanned document.       Allergies   Allergen Reactions    Vancomycin Rash           PHYSICAL EXAM:        HEAD: Normal appearance and symmetry:  No cutaneous lesions.      EARS:        RIGHT: TM intact   LEFT:   TM intact  NOSE:    Dorsum:   straight       ORAL CAVITY/OROPHARYNX:    Lips:  Normal.     NECK:  Trachea:  midline     NEURO:   Alert and Oriented    GAIT AND STATION:  normal     RESPIRATORY:   Symmetry and Respiratory effort    PSYCH:   normal mood and affect    SKIN:  warm and dry     AUDIOGRAM: slightly worse tresholds RIGHT ear with 27% WR (previously 90%)      IMPRESSION:   Encounter Diagnosis   Name Primary?    SNHL (sensory-neural hearing loss), asymmetrical Yes            RECOMMENDATIONS:    Refer to audiology for hearing aid adjustment  Recheck hearing in 6 months.   Discussed option of Cochlear implantation with patient, he will take it under consideration.   Counseling time 15min/25 minute visit.

## 2024-06-18 ENCOUNTER — OFFICE VISIT (OUTPATIENT)
Dept: AUDIOLOGY | Facility: CLINIC | Age: 64
End: 2024-06-18
Payer: MEDICARE

## 2024-06-18 DIAGNOSIS — H90.3 SENSORINEURAL HEARING LOSS (SNHL) OF BOTH EARS: Primary | ICD-10-CM

## 2024-06-18 PROCEDURE — V5020 CONFORMITY EVALUATION: HCPCS | Performed by: AUDIOLOGIST

## 2024-06-18 PROCEDURE — 92592 PR HEARING AID CHECK, MONAURAL: CPT | Mod: RT | Performed by: AUDIOLOGIST

## 2024-06-18 NOTE — PROGRESS NOTES
AUDIOLOGY REPORT    SUBJECTIVE: Gabriel Colorado is a 63 year old male who was seen in the Audiology Clinic at the [facility] on 6/18/2024 for a hearing aid check. He last had his hearing assessed at this clinic on 06/10/24 and results revealed severe to profound likely sensorineural hearing loss in the right ear and profound likely sensorineural hearing loss in the left ear. Gabriel was fit with a Phonak Audeo P70-R with a cShell and power  in his right ear and a Phonak CROS P-R in his left ear on 06/27/23.     It was recommended that Gabriel return for a hearing aid adjustment since there was a significant decline in hearing since his last hearing test. Gabriel also experienced a significant decline in his word recognition score in his right ear since his previous hearing test on 09/06/23.    OBJECTIVE:   The right hearing aid will be reprogrammed today due to the change in hearing that was found at the hearing test on 06/10/24.     The hearing aid and CROS are connected to the computer. A performance update is performed on both devices.    Real-ear-probe-microphone measurements were completed on the [employer] system and were a good match to NAL-NL1 target with soft sounds audible, moderate sounds comfortable, and loud sounds below discomfort. The hearing aids were only able to meet gain targets at 6663-8112 Hz. UCLs are verified through maximum power output measures and demonstrate appropriate limiting of loud inputs.     Speech Intelligibility Index (SII)  The speech intelligibility index (SII) estimates the amount of audible speech at different presentation levels through the hearing aids. The following SII values represent audibility for average inputs:  Unaided SII:    0(R)  Aided SII:        25(R)    Frequency lowering is enabled above 2000 Hz since the hearing aid was unable to meet gain targets. Gabriel is told that his hearing aid is currently at it's maximum volume setting at this point. He  states that he is able to hear much better with this new setting. He would like to try the hearing aid out at this new setting.    ASSESSMENT: Hearing aid check completed. Real-ear measures were completed on the right hearing aid.    PLAN: Gabriel is scheduled to return for a hearing test and hearing aid check in 6 months to monitor his hearing. A more powerful  should be considered for his right ear if he is struggling with the current hearing aid settings at his next appointment. A cochlear implant evaluation may also be discussed with him again at that time. Please call this clinic with any questions regarding today s appointment.    Chaim Combs, [facility]  [place] Licensed Audiologist #[id number]

## 2025-02-09 ENCOUNTER — APPOINTMENT (OUTPATIENT)
Dept: MRI IMAGING | Facility: CLINIC | Age: 65
DRG: 948 | End: 2025-02-09
Payer: MEDICARE

## 2025-02-09 ENCOUNTER — HOSPITAL ENCOUNTER (INPATIENT)
Facility: CLINIC | Age: 65
LOS: 2 days | Discharge: NURSING, REHAB OR LONG-TERM CARE | DRG: 948 | End: 2025-02-11
Attending: EMERGENCY MEDICINE | Admitting: STUDENT IN AN ORGANIZED HEALTH CARE EDUCATION/TRAINING PROGRAM
Payer: MEDICARE

## 2025-02-09 ENCOUNTER — APPOINTMENT (OUTPATIENT)
Dept: CT IMAGING | Facility: CLINIC | Age: 65
DRG: 948 | End: 2025-02-09
Attending: EMERGENCY MEDICINE
Payer: MEDICARE

## 2025-02-09 ENCOUNTER — APPOINTMENT (OUTPATIENT)
Dept: CT IMAGING | Facility: CLINIC | Age: 65
DRG: 948 | End: 2025-02-09
Payer: MEDICARE

## 2025-02-09 DIAGNOSIS — I63.9 ISCHEMIC STROKE (H): ICD-10-CM

## 2025-02-09 DIAGNOSIS — I10 ESSENTIAL HYPERTENSION: Primary | Chronic | ICD-10-CM

## 2025-02-09 DIAGNOSIS — R41.82 ALTERED MENTAL STATUS, UNSPECIFIED ALTERED MENTAL STATUS TYPE: ICD-10-CM

## 2025-02-09 LAB
ALBUMIN SERPL BCG-MCNC: 4.2 G/DL (ref 3.5–5.2)
ALBUMIN UR-MCNC: 70 MG/DL
ALP SERPL-CCNC: 81 U/L (ref 40–150)
ALT SERPL W P-5'-P-CCNC: 45 U/L (ref 0–70)
ANION GAP SERPL CALCULATED.3IONS-SCNC: 14 MMOL/L (ref 7–15)
APPEARANCE UR: ABNORMAL
APTT PPP: 28 SECONDS (ref 22–38)
AST SERPL W P-5'-P-CCNC: 25 U/L (ref 0–45)
BASOPHILS # BLD AUTO: 0 10E3/UL (ref 0–0.2)
BASOPHILS NFR BLD AUTO: 0 %
BILIRUB SERPL-MCNC: 0.5 MG/DL
BILIRUB UR QL STRIP: NEGATIVE
BUN SERPL-MCNC: 18.3 MG/DL (ref 8–23)
CALCIUM SERPL-MCNC: 9.9 MG/DL (ref 8.8–10.4)
CHLORIDE SERPL-SCNC: 103 MMOL/L (ref 98–107)
CHOLEST SERPL-MCNC: 129 MG/DL
COLOR UR AUTO: ABNORMAL
CREAT BLD-MCNC: 0.7 MG/DL (ref 0.7–1.3)
CREAT SERPL-MCNC: 0.65 MG/DL (ref 0.67–1.17)
CREAT SERPL-MCNC: 0.66 MG/DL (ref 0.67–1.17)
EGFRCR SERPLBLD CKD-EPI 2021: >60 ML/MIN/1.73M2
EGFRCR SERPLBLD CKD-EPI 2021: >90 ML/MIN/1.73M2
EGFRCR SERPLBLD CKD-EPI 2021: >90 ML/MIN/1.73M2
EOSINOPHIL # BLD AUTO: 0.1 10E3/UL (ref 0–0.7)
EOSINOPHIL NFR BLD AUTO: 1 %
ERYTHROCYTE [DISTWIDTH] IN BLOOD BY AUTOMATED COUNT: 14 % (ref 10–15)
EST. AVERAGE GLUCOSE BLD GHB EST-MCNC: 171 MG/DL
GLUCOSE BLDC GLUCOMTR-MCNC: 103 MG/DL (ref 70–99)
GLUCOSE BLDC GLUCOMTR-MCNC: 108 MG/DL (ref 70–99)
GLUCOSE BLDC GLUCOMTR-MCNC: 113 MG/DL (ref 70–99)
GLUCOSE BLDC GLUCOMTR-MCNC: 138 MG/DL (ref 70–99)
GLUCOSE SERPL-MCNC: 132 MG/DL (ref 70–99)
GLUCOSE UR STRIP-MCNC: NEGATIVE MG/DL
HBA1C MFR BLD: 7.6 %
HCO3 SERPL-SCNC: 24 MMOL/L (ref 22–29)
HCT VFR BLD AUTO: 47 % (ref 40–53)
HDLC SERPL-MCNC: 39 MG/DL
HGB BLD-MCNC: 15.4 G/DL (ref 13.3–17.7)
HGB UR QL STRIP: ABNORMAL
IMM GRANULOCYTES # BLD: 0 10E3/UL
IMM GRANULOCYTES NFR BLD: 0 %
INR PPP: 0.94 (ref 0.85–1.15)
INR PPP: 0.99 (ref 0.85–1.15)
KETONES UR STRIP-MCNC: 40 MG/DL
LDLC SERPL CALC-MCNC: 68 MG/DL
LEUKOCYTE ESTERASE UR QL STRIP: ABNORMAL
LYMPHOCYTES # BLD AUTO: 2 10E3/UL (ref 0.8–5.3)
LYMPHOCYTES NFR BLD AUTO: 23 %
MAGNESIUM SERPL-MCNC: 1.8 MG/DL (ref 1.7–2.3)
MCH RBC QN AUTO: 29.8 PG (ref 26.5–33)
MCHC RBC AUTO-ENTMCNC: 32.8 G/DL (ref 31.5–36.5)
MCV RBC AUTO: 91 FL (ref 78–100)
MONOCYTES # BLD AUTO: 0.4 10E3/UL (ref 0–1.3)
MONOCYTES NFR BLD AUTO: 5 %
NEUTROPHILS # BLD AUTO: 6.1 10E3/UL (ref 1.6–8.3)
NEUTROPHILS NFR BLD AUTO: 71 %
NITRATE UR QL: NEGATIVE
NONHDLC SERPL-MCNC: 90 MG/DL
NRBC # BLD AUTO: 0 10E3/UL
NRBC BLD AUTO-RTO: 0 /100
PH UR STRIP: 6.5 [PH] (ref 5–7)
PHOSPHATE SERPL-MCNC: 3.6 MG/DL (ref 2.5–4.5)
PLATELET # BLD AUTO: 346 10E3/UL (ref 150–450)
POTASSIUM SERPL-SCNC: 3.6 MMOL/L (ref 3.4–5.3)
PROT SERPL-MCNC: 7.5 G/DL (ref 6.4–8.3)
RADIOLOGIST FLAGS: ABNORMAL
RBC # BLD AUTO: 5.16 10E6/UL (ref 4.4–5.9)
RBC URINE: >182 /HPF
SODIUM SERPL-SCNC: 141 MMOL/L (ref 135–145)
SP GR UR STRIP: 1.01 (ref 1–1.03)
TRIGL SERPL-MCNC: 109 MG/DL
TROPONIN T SERPL HS-MCNC: 13 NG/L
TSH SERPL DL<=0.005 MIU/L-ACNC: 0.54 UIU/ML (ref 0.3–4.2)
UROBILINOGEN UR STRIP-MCNC: NORMAL MG/DL
WBC # BLD AUTO: 8.6 10E3/UL (ref 4–11)
WBC URINE: 4 /HPF

## 2025-02-09 PROCEDURE — 250N000011 HC RX IP 250 OP 636: Performed by: EMERGENCY MEDICINE

## 2025-02-09 PROCEDURE — 70553 MRI BRAIN STEM W/O & W/DYE: CPT | Mod: 26 | Performed by: STUDENT IN AN ORGANIZED HEALTH CARE EDUCATION/TRAINING PROGRAM

## 2025-02-09 PROCEDURE — 80053 COMPREHEN METABOLIC PANEL: CPT | Performed by: EMERGENCY MEDICINE

## 2025-02-09 PROCEDURE — 0042T CT HEAD PERFUSION W CONTRAST: CPT

## 2025-02-09 PROCEDURE — 85610 PROTHROMBIN TIME: CPT | Performed by: EMERGENCY MEDICINE

## 2025-02-09 PROCEDURE — 70450 CT HEAD/BRAIN W/O DYE: CPT

## 2025-02-09 PROCEDURE — 85025 COMPLETE CBC W/AUTO DIFF WBC: CPT | Performed by: EMERGENCY MEDICINE

## 2025-02-09 PROCEDURE — 82962 GLUCOSE BLOOD TEST: CPT

## 2025-02-09 PROCEDURE — 36415 COLL VENOUS BLD VENIPUNCTURE: CPT | Performed by: EMERGENCY MEDICINE

## 2025-02-09 PROCEDURE — 84443 ASSAY THYROID STIM HORMONE: CPT

## 2025-02-09 PROCEDURE — 250N000013 HC RX MED GY IP 250 OP 250 PS 637

## 2025-02-09 PROCEDURE — 82565 ASSAY OF CREATININE: CPT

## 2025-02-09 PROCEDURE — 70496 CT ANGIOGRAPHY HEAD: CPT

## 2025-02-09 PROCEDURE — 120N000002 HC R&B MED SURG/OB UMMC

## 2025-02-09 PROCEDURE — 250N000011 HC RX IP 250 OP 636

## 2025-02-09 PROCEDURE — 83735 ASSAY OF MAGNESIUM: CPT | Performed by: EMERGENCY MEDICINE

## 2025-02-09 PROCEDURE — 80061 LIPID PANEL: CPT

## 2025-02-09 PROCEDURE — 70553 MRI BRAIN STEM W/O & W/DYE: CPT

## 2025-02-09 PROCEDURE — 85610 PROTHROMBIN TIME: CPT

## 2025-02-09 PROCEDURE — 83036 HEMOGLOBIN GLYCOSYLATED A1C: CPT

## 2025-02-09 PROCEDURE — 85730 THROMBOPLASTIN TIME PARTIAL: CPT

## 2025-02-09 PROCEDURE — 255N000002 HC RX 255 OP 636

## 2025-02-09 PROCEDURE — 70496 CT ANGIOGRAPHY HEAD: CPT | Mod: 26 | Performed by: STUDENT IN AN ORGANIZED HEALTH CARE EDUCATION/TRAINING PROGRAM

## 2025-02-09 PROCEDURE — 84100 ASSAY OF PHOSPHORUS: CPT | Performed by: EMERGENCY MEDICINE

## 2025-02-09 PROCEDURE — 84484 ASSAY OF TROPONIN QUANT: CPT

## 2025-02-09 PROCEDURE — 82040 ASSAY OF SERUM ALBUMIN: CPT | Performed by: EMERGENCY MEDICINE

## 2025-02-09 PROCEDURE — 99285 EMERGENCY DEPT VISIT HI MDM: CPT | Performed by: EMERGENCY MEDICINE

## 2025-02-09 PROCEDURE — 81001 URINALYSIS AUTO W/SCOPE: CPT | Performed by: EMERGENCY MEDICINE

## 2025-02-09 PROCEDURE — 0042T CT HEAD PERFUSION W CONTRAST: CPT | Mod: GC | Performed by: STUDENT IN AN ORGANIZED HEALTH CARE EDUCATION/TRAINING PROGRAM

## 2025-02-09 PROCEDURE — 80051 ELECTROLYTE PANEL: CPT | Performed by: EMERGENCY MEDICINE

## 2025-02-09 PROCEDURE — 36415 COLL VENOUS BLD VENIPUNCTURE: CPT

## 2025-02-09 PROCEDURE — 99285 EMERGENCY DEPT VISIT HI MDM: CPT | Mod: 25 | Performed by: EMERGENCY MEDICINE

## 2025-02-09 PROCEDURE — A9585 GADOBUTROL INJECTION: HCPCS

## 2025-02-09 PROCEDURE — 70498 CT ANGIOGRAPHY NECK: CPT | Mod: 26 | Performed by: STUDENT IN AN ORGANIZED HEALTH CARE EDUCATION/TRAINING PROGRAM

## 2025-02-09 RX ORDER — GADOBUTROL 604.72 MG/ML
0.1 INJECTION INTRAVENOUS ONCE
Status: COMPLETED | OUTPATIENT
Start: 2025-02-09 | End: 2025-02-09

## 2025-02-09 RX ORDER — ATORVASTATIN CALCIUM 40 MG/1
40 TABLET, FILM COATED ORAL EVERY EVENING
Status: DISCONTINUED | OUTPATIENT
Start: 2025-02-09 | End: 2025-02-09

## 2025-02-09 RX ORDER — CLOPIDOGREL BISULFATE 75 MG/1
75 TABLET ORAL DAILY
COMMUNITY

## 2025-02-09 RX ORDER — DAPAGLIFLOZIN 10 MG/1
10 TABLET, FILM COATED ORAL DAILY
Status: DISCONTINUED | OUTPATIENT
Start: 2025-02-09 | End: 2025-02-11 | Stop reason: HOSPADM

## 2025-02-09 RX ORDER — AMANTADINE HYDROCHLORIDE 100 MG/1
100 CAPSULE, GELATIN COATED ORAL DAILY
Status: DISCONTINUED | OUTPATIENT
Start: 2025-02-09 | End: 2025-02-11 | Stop reason: HOSPADM

## 2025-02-09 RX ORDER — IOPAMIDOL 755 MG/ML
67 INJECTION, SOLUTION INTRAVASCULAR ONCE
Status: COMPLETED | OUTPATIENT
Start: 2025-02-09 | End: 2025-02-09

## 2025-02-09 RX ORDER — AMLODIPINE BESYLATE 2.5 MG/1
2.5 TABLET ORAL DAILY
Status: DISCONTINUED | OUTPATIENT
Start: 2025-02-09 | End: 2025-02-09

## 2025-02-09 RX ORDER — ATORVASTATIN CALCIUM 80 MG/1
80 TABLET, FILM COATED ORAL EVERY EVENING
Status: DISCONTINUED | OUTPATIENT
Start: 2025-02-09 | End: 2025-02-11 | Stop reason: HOSPADM

## 2025-02-09 RX ORDER — ONDANSETRON 4 MG/1
4 TABLET, ORALLY DISINTEGRATING ORAL EVERY 6 HOURS PRN
Status: DISCONTINUED | OUTPATIENT
Start: 2025-02-09 | End: 2025-02-11 | Stop reason: HOSPADM

## 2025-02-09 RX ORDER — ATORVASTATIN CALCIUM 80 MG/1
80 TABLET, FILM COATED ORAL EVERY EVENING
COMMUNITY

## 2025-02-09 RX ORDER — HYDRALAZINE HYDROCHLORIDE 20 MG/ML
10-20 INJECTION INTRAMUSCULAR; INTRAVENOUS
Status: DISCONTINUED | OUTPATIENT
Start: 2025-02-09 | End: 2025-02-11 | Stop reason: HOSPADM

## 2025-02-09 RX ORDER — DEXTROSE MONOHYDRATE 25 G/50ML
25-50 INJECTION, SOLUTION INTRAVENOUS
Status: DISCONTINUED | OUTPATIENT
Start: 2025-02-09 | End: 2025-02-11 | Stop reason: HOSPADM

## 2025-02-09 RX ORDER — DONEPEZIL HYDROCHLORIDE 5 MG/1
5 TABLET, FILM COATED ORAL EVERY EVENING
COMMUNITY

## 2025-02-09 RX ORDER — LIDOCAINE 40 MG/G
CREAM TOPICAL
Status: DISCONTINUED | OUTPATIENT
Start: 2025-02-09 | End: 2025-02-11 | Stop reason: HOSPADM

## 2025-02-09 RX ORDER — ACETAMINOPHEN 325 MG/1
650 TABLET ORAL 3 TIMES DAILY
COMMUNITY

## 2025-02-09 RX ORDER — ONDANSETRON 2 MG/ML
4 INJECTION INTRAMUSCULAR; INTRAVENOUS EVERY 6 HOURS PRN
Status: DISCONTINUED | OUTPATIENT
Start: 2025-02-09 | End: 2025-02-11 | Stop reason: HOSPADM

## 2025-02-09 RX ORDER — LISINOPRIL 5 MG/1
5 TABLET ORAL DAILY
Status: DISCONTINUED | OUTPATIENT
Start: 2025-02-09 | End: 2025-02-11 | Stop reason: HOSPADM

## 2025-02-09 RX ORDER — AMANTADINE HYDROCHLORIDE 100 MG/1
100 CAPSULE, GELATIN COATED ORAL DAILY
COMMUNITY

## 2025-02-09 RX ORDER — LABETALOL HYDROCHLORIDE 5 MG/ML
10-20 INJECTION, SOLUTION INTRAVENOUS EVERY 10 MIN PRN
Status: DISCONTINUED | OUTPATIENT
Start: 2025-02-09 | End: 2025-02-11 | Stop reason: HOSPADM

## 2025-02-09 RX ORDER — DONEPEZIL HYDROCHLORIDE 5 MG/1
5 TABLET, FILM COATED ORAL EVERY EVENING
Status: DISCONTINUED | OUTPATIENT
Start: 2025-02-09 | End: 2025-02-11 | Stop reason: HOSPADM

## 2025-02-09 RX ORDER — DAPAGLIFLOZIN 10 MG/1
10 TABLET, FILM COATED ORAL DAILY
COMMUNITY

## 2025-02-09 RX ORDER — EZETIMIBE 10 MG/1
10 TABLET ORAL AT BEDTIME
Status: DISCONTINUED | OUTPATIENT
Start: 2025-02-09 | End: 2025-02-11 | Stop reason: HOSPADM

## 2025-02-09 RX ORDER — ASPIRIN 81 MG/1
81 TABLET ORAL DAILY
Status: DISCONTINUED | OUTPATIENT
Start: 2025-02-09 | End: 2025-02-11 | Stop reason: HOSPADM

## 2025-02-09 RX ORDER — FAMOTIDINE 20 MG/1
40 TABLET, FILM COATED ORAL EVERY EVENING
Status: DISCONTINUED | OUTPATIENT
Start: 2025-02-09 | End: 2025-02-11 | Stop reason: HOSPADM

## 2025-02-09 RX ORDER — TRAZODONE HYDROCHLORIDE 50 MG/1
50 TABLET ORAL EVERY EVENING
COMMUNITY

## 2025-02-09 RX ORDER — OLANZAPINE 10 MG/2ML
5 INJECTION, POWDER, FOR SOLUTION INTRAMUSCULAR ONCE
Status: COMPLETED | OUTPATIENT
Start: 2025-02-09 | End: 2025-02-09

## 2025-02-09 RX ORDER — FAMOTIDINE 40 MG/1
40 TABLET, FILM COATED ORAL EVERY EVENING
COMMUNITY

## 2025-02-09 RX ORDER — ENOXAPARIN SODIUM 100 MG/ML
40 INJECTION SUBCUTANEOUS EVERY 24 HOURS
Status: DISCONTINUED | OUTPATIENT
Start: 2025-02-09 | End: 2025-02-11 | Stop reason: HOSPADM

## 2025-02-09 RX ORDER — LISINOPRIL 5 MG/1
5 TABLET ORAL DAILY
COMMUNITY

## 2025-02-09 RX ORDER — CLOPIDOGREL BISULFATE 75 MG/1
75 TABLET ORAL DAILY
Status: DISCONTINUED | OUTPATIENT
Start: 2025-02-09 | End: 2025-02-11 | Stop reason: HOSPADM

## 2025-02-09 RX ORDER — EZETIMIBE 10 MG/1
10 TABLET ORAL AT BEDTIME
Status: ON HOLD | COMMUNITY
End: 2025-02-11

## 2025-02-09 RX ORDER — PROCHLORPERAZINE MALEATE 5 MG/1
5 TABLET ORAL EVERY 6 HOURS PRN
Status: DISCONTINUED | OUTPATIENT
Start: 2025-02-09 | End: 2025-02-11 | Stop reason: HOSPADM

## 2025-02-09 RX ORDER — ASPIRIN 300 MG/1
300 SUPPOSITORY RECTAL ONCE
Status: COMPLETED | OUTPATIENT
Start: 2025-02-09 | End: 2025-02-09

## 2025-02-09 RX ORDER — NICOTINE POLACRILEX 4 MG
15-30 LOZENGE BUCCAL
Status: DISCONTINUED | OUTPATIENT
Start: 2025-02-09 | End: 2025-02-11 | Stop reason: HOSPADM

## 2025-02-09 RX ADMIN — ENOXAPARIN SODIUM 40 MG: 40 INJECTION SUBCUTANEOUS at 18:23

## 2025-02-09 RX ADMIN — GADOBUTROL 7.1 ML: 604.72 INJECTION INTRAVENOUS at 17:24

## 2025-02-09 RX ADMIN — ASPIRIN 300 MG: 300 SUPPOSITORY RECTAL at 18:01

## 2025-02-09 RX ADMIN — OLANZAPINE 5 MG: 10 INJECTION, POWDER, FOR SOLUTION INTRAMUSCULAR at 20:17

## 2025-02-09 RX ADMIN — IOPAMIDOL 117 ML: 755 INJECTION, SOLUTION INTRAVENOUS at 13:33

## 2025-02-09 ASSESSMENT — ACTIVITIES OF DAILY LIVING (ADL)
ADLS_ACUITY_SCORE: 41
ADLS_ACUITY_SCORE: 51
ADLS_ACUITY_SCORE: 41
ADLS_ACUITY_SCORE: 41
ADLS_ACUITY_SCORE: 75
ADLS_ACUITY_SCORE: 41
ADLS_ACUITY_SCORE: 41
ADLS_ACUITY_SCORE: 51
ADLS_ACUITY_SCORE: 41

## 2025-02-09 NOTE — ED PROVIDER NOTES
[place] EMERGENCY DEPARTMENT ([facility])    2/09/25       ED PROVIDER NOTE    History     Chief Complaint   Patient presents with    Altered Mental Status     HPI  Gabriel Colorado is a 64 year old male with a past medical history of ischemic stroke, recent fall, type 2 diabetes, chronic constipation, and sensorineural hearing loss, who presents to the ED for evaluation of altered mental status.  By chart review the patient has previously had a MCA stroke in December was seen on imaging at an outside hospital, followed by repeat imaging in January that showed worsening progression of stroke.  He has had persistent deficits since that time with hemiplegia and aphasia, and currently living in a group home.  According to the group home, the patient developed new onset altered mental status today and is completely unresponsive, last normal yesterday sometime before bed.  EMS was called and brought the patient in stating that his blood glucose was 120, and he was not responsive for them at all in route.  Patient is unable to provide history on arrival as he is unresponsive.    Past Medical History  Past Medical History:   Diagnosis Date    Asthma     Depression     HTN (hypertension)      Past Surgical History:   Procedure Laterality Date    IR CAROTID CEREBRAL ANGIOGRAM BILATERAL  12/12/2024    IR LUMBAR PUNCTURE  6/14/2020    IR SPINAL PUNCTURE  6/14/2020    PICC INSERTION - TRIPLE LUMEN  5/9/2020          albuterol (PROAIR HFA;PROVENTIL HFA;VENTOLIN HFA) 90 mcg/actuation inhaler  amLODIPine-benazepril (LOTREL) 5-40 mg per capsule  ARTHRITIS PAIN RELIEF, ACETAM, 650 mg CR tablet  aspirin 81 MG EC tablet  atorvastatin (LIPITOR) 40 MG tablet  cetirizine (ZYRTEC) 10 MG tablet  metFORMIN (GLUCOPHAGE-XR) 500 MG 24 hr tablet  traZODone (DESYREL) 100 MG tablet  VITAMIN D3 50 mcg (2,000 unit) capsule      Allergies   Allergen Reactions    Vancomycin Rash     Family History  No family history on file.  Social History        Past medical history, past surgical history, medications, allergies, family history, and social history were reviewed with the patient. No additional pertinent items.   A complete review of systems was attempted but limited due to altered mental status.    Physical Exam   BP: (!) 168/84  Pulse: 82  Temp: 97.9  F (36.6  C)  Resp: 18  SpO2: 98 %  Physical Exam  Vitals and nursing note reviewed.   Constitutional:       General: He is not in acute distress.     Appearance: Normal appearance. He is not toxic-appearing.   HENT:      Head: Atraumatic.   Eyes:      General: No scleral icterus.     Conjunctiva/sclera: Conjunctivae normal.   Cardiovascular:      Rate and Rhythm: Normal rate.      Heart sounds: Normal heart sounds.   Pulmonary:      Effort: Pulmonary effort is normal. No respiratory distress.      Breath sounds: Normal breath sounds.   Abdominal:      Palpations: Abdomen is soft.      Tenderness: There is no abdominal tenderness.   Musculoskeletal:         General: No deformity.      Cervical back: Neck supple.   Skin:     General: Skin is warm.   Neurological:      Comments: GCS 3; breathing spontaneously but otherwise eyes are closed and patient is completely unresponsive, not moving any extremities, not making any verbal noises or attempts; extremities are rigid with passive movement            ED Course, Procedures, & Data      Procedures       A consult was attained from the Neurology service. The case was discussed with on call Neurology resident from that service. The consulting service's recommendations were provided promptly.          Results for orders placed or performed during the hospital encounter of 02/09/25   Head CT w/o contrast     Status: None (Preliminary result)    Impression    RESIDENT PRELIMINARY INTERPRETATION  Impression:  Chronic encephalomalacia of the left middle cerebral artery infarct.  No acute intracranial hemorrhage.     CTA Head Neck with Contrast     Status: None  (Preliminary result)    Impression    RESIDENT PRELIMINARY INTERPRETATION  Impression:    1. Head CTA demonstrates subacute/chronic occlusion of the M1 segment  of the left middle cerebral artery with associated encephalomalacia.  No available images for comparison from recent OS admission 1/6/2025,  although prior report 12/12/2024 reports a left MCA M1 occlusion at  that time. High-grade stenosis of the V4 segment of the right  vertebral artery.  2. Neck CTA demonstrates no stenosis of the major cervical arteries.      [Urgent Result: Occlusion of the M1 segment of the left middle  cerebral artery    Finding was identified on 2/9/2025 2:05 PM.     Taj Gamez MD was contacted by Dr. Gilda DONIS at 2/9/2025 2:17 PM  and verbalized understanding of the urgent finding.    CT Head Perfusion w Contrast     Status: None (Preliminary result)    Impression    RESIDENT PRELIMINARY INTERPRETATION  Impression:   1. Elevated Tmax in the left middle cerebral artery vascular territory  compatible with known infarct in that region.  2. No evidence of intracranial hemorrhage.     Comprehensive metabolic panel     Status: Abnormal   Result Value Ref Range    Sodium 141 135 - 145 mmol/L    Potassium 3.6 3.4 - 5.3 mmol/L    Carbon Dioxide (CO2) 24 22 - 29 mmol/L    Anion Gap 14 7 - 15 mmol/L    Urea Nitrogen 18.3 8.0 - 23.0 mg/dL    Creatinine 0.66 (L) 0.67 - 1.17 mg/dL    GFR Estimate >90 >60 mL/min/1.73m2    Calcium 9.9 8.8 - 10.4 mg/dL    Chloride 103 98 - 107 mmol/L    Glucose 132 (H) 70 - 99 mg/dL    Alkaline Phosphatase 81 40 - 150 U/L    AST 25 0 - 45 U/L    ALT 45 0 - 70 U/L    Protein Total 7.5 6.4 - 8.3 g/dL    Albumin 4.2 3.5 - 5.2 g/dL    Bilirubin Total 0.5 <=1.2 mg/dL   INR     Status: Normal   Result Value Ref Range    INR 0.94 0.85 - 1.15   Magnesium     Status: Normal   Result Value Ref Range    Magnesium 1.8 1.7 - 2.3 mg/dL   Phosphorus     Status: Normal   Result Value Ref Range    Phosphorus 3.6 2.5 - 4.5 mg/dL    Glucose by meter     Status: Abnormal   Result Value Ref Range    GLUCOSE BY METER POCT 138 (H) 70 - 99 mg/dL   Creatinine POCT     Status: Normal   Result Value Ref Range    Creatinine POCT 0.7 0.7 - 1.3 mg/dL    GFR, ESTIMATED POCT >60 >60 mL/min/1.73m2   CBC with platelets and differential     Status: None   Result Value Ref Range    WBC Count 8.6 4.0 - 11.0 10e3/uL    RBC Count 5.16 4.40 - 5.90 10e6/uL    Hemoglobin 15.4 13.3 - 17.7 g/dL    Hematocrit 47.0 40.0 - 53.0 %    MCV 91 78 - 100 fL    MCH 29.8 26.5 - 33.0 pg    MCHC 32.8 31.5 - 36.5 g/dL    RDW 14.0 10.0 - 15.0 %    Platelet Count 346 150 - 450 10e3/uL    % Neutrophils 71 %    % Lymphocytes 23 %    % Monocytes 5 %    % Eosinophils 1 %    % Basophils 0 %    % Immature Granulocytes 0 %    NRBCs per 100 WBC 0 <1 /100    Absolute Neutrophils 6.1 1.6 - 8.3 10e3/uL    Absolute Lymphocytes 2.0 0.8 - 5.3 10e3/uL    Absolute Monocytes 0.4 0.0 - 1.3 10e3/uL    Absolute Eosinophils 0.1 0.0 - 0.7 10e3/uL    Absolute Basophils 0.0 0.0 - 0.2 10e3/uL    Absolute Immature Granulocytes 0.0 <=0.4 10e3/uL    Absolute NRBCs 0.0 10e3/uL   CBC with Platelets & Differential     Status: None    Narrative    The following orders were created for panel order CBC with Platelets & Differential.  Procedure                               Abnormality         Status                     ---------                               -----------         ------                     CBC with platelets and d...[[id number]]                      Final result                 Please view results for these tests on the individual orders.     Medications   amLODIPine (NORVASC) tablet 2.5 mg ( Oral Automatically Held 2/12/25 0800)     And   lisinopril (ZESTRIL) tablet 10 mg ( Oral Automatically Held 2/12/25 0800)   aspirin EC tablet 81 mg ( Oral Automatically Held 2/12/25 0800)   atorvastatin (LIPITOR) tablet 40 mg ( Oral Automatically Held 2/12/25 2000)   lidocaine 1 % 0.1-1 mL (has no administration  in time range)   lidocaine (LMX4) cream (has no administration in time range)   sodium chloride (PF) 0.9% PF flush 3 mL (has no administration in time range)   sodium chloride (PF) 0.9% PF flush 3 mL (has no administration in time range)   enoxaparin ANTICOAGULANT (LOVENOX) injection 40 mg (has no administration in time range)   labetalol (NORMODYNE/TRANDATE) injection 10-20 mg (has no administration in time range)     Or   hydrALAZINE (APRESOLINE) injection 10-20 mg (has no administration in time range)   ondansetron (ZOFRAN ODT) ODT tab 4 mg (has no administration in time range)     Or   ondansetron (ZOFRAN) injection 4 mg (has no administration in time range)   prochlorperazine (COMPAZINE) injection 5 mg (has no administration in time range)     Or   prochlorperazine (COMPAZINE) tablet 5 mg (has no administration in time range)   iopamidol (ISOVUE-370) solution 67 mL (117 mLs Intravenous $Given 2/9/25 1333)   sodium chloride (PF) 0.9% PF flush 90 mL (90 mLs Intravenous $Given 2/9/25 1332)     Labs Ordered and Resulted from Time of ED Arrival to Time of ED Departure   COMPREHENSIVE METABOLIC PANEL - Abnormal       Result Value    Sodium 141      Potassium 3.6      Carbon Dioxide (CO2) 24      Anion Gap 14      Urea Nitrogen 18.3      Creatinine 0.66 (*)     GFR Estimate >90      Calcium 9.9      Chloride 103      Glucose 132 (*)     Alkaline Phosphatase 81      AST 25      ALT 45      Protein Total 7.5      Albumin 4.2      Bilirubin Total 0.5     GLUCOSE BY METER - Abnormal    GLUCOSE BY METER POCT 138 (*)    INR - Normal    INR 0.94     MAGNESIUM - Normal    Magnesium 1.8     PHOSPHORUS - Normal    Phosphorus 3.6     ISTAT CREATININE POCT - Normal    Creatinine POCT 0.7      GFR, ESTIMATED POCT >60     CBC WITH PLATELETS AND DIFFERENTIAL    WBC Count 8.6      RBC Count 5.16      Hemoglobin 15.4      Hematocrit 47.0      MCV 91      MCH 29.8      MCHC 32.8      RDW 14.0      Platelet Count 346      % Neutrophils  71      % Lymphocytes 23      % Monocytes 5      % Eosinophils 1      % Basophils 0      % Immature Granulocytes 0      NRBCs per 100 WBC 0      Absolute Neutrophils 6.1      Absolute Lymphocytes 2.0      Absolute Monocytes 0.4      Absolute Eosinophils 0.1      Absolute Basophils 0.0      Absolute Immature Granulocytes 0.0      Absolute NRBCs 0.0     ROUTINE UA WITH MICROSCOPIC REFLEX TO CULTURE   GLUCOSE MONITOR NURSING POCT   GLUCOSE MONITOR NURSING POCT   GLUCOSE MONITOR NURSING POCT   TROPONIN T, HIGH SENSITIVITY   HEMOGLOBIN A1C   LIPID PROFILE   INR   PARTIAL THROMBOPLASTIN TIME   TSH   CREATININE   GLUCOSE BY METER POCT   ISTAT CREATININE POCT     CT Head Perfusion w Contrast   Preliminary Result   RESIDENT PRELIMINARY INTERPRETATION   Impression:    1. Elevated Tmax in the left middle cerebral artery vascular territory   compatible with known infarct in that region.   2. No evidence of intracranial hemorrhage.         CTA Head Neck with Contrast   Preliminary Result   RESIDENT PRELIMINARY INTERPRETATION   Impression:     1. Head CTA demonstrates subacute/chronic occlusion of the M1 segment   of the left middle cerebral artery with associated encephalomalacia.   No available images for comparison from recent OSH admission 1/6/2025,   although prior report 12/12/2024 reports a left MCA M1 occlusion at   that time. High-grade stenosis of the V4 segment of the right   vertebral artery.   2. Neck CTA demonstrates no stenosis of the major cervical arteries.         [Urgent Result: Occlusion of the M1 segment of the left middle   cerebral artery      Finding was identified on 2/9/2025 2:05 PM.       Taj Gamez MD was contacted by Dr. Gilda DONIS at 2/9/2025 2:17 PM   and verbalized understanding of the urgent finding.       Head CT w/o contrast   Preliminary Result   RESIDENT PRELIMINARY INTERPRETATION   Impression:   Chronic encephalomalacia of the left middle cerebral artery infarct.   No acute intracranial  hemorrhage.         MR Brain w/o & w Contrast    (Results Pending)          Critical care was not performed.     Medical Decision Making  The patient's presentation was of high complexity (an acute health issue posing potential threat to life or bodily function).    The patient's evaluation involved:  review of external note(s) from 1 sources (see separate area of note for details)  review of 3+ test result(s) ordered prior to this encounter (see separate area of note for details)  ordering and/or review of 3+ test(s) in this encounter (see separate area of note for details)  discussion of management or test interpretation with another health professional (see separate area of note for details)    The patient's management necessitated high risk (a decision regarding hospitalization).    Assessment & Plan    Gabriel Colorado is a 64 year old male with a past medical history of ischemic stroke, recent fall, type 2 diabetes, chronic constipation, and sensorineural hearing loss, who presents to the ED for evaluation of altered mental status.  Patient is unresponsive on arrival with a GCS of 3.  He is hypertensive with a blood pressure of 153/98, and other vital signs are within normal limits.  Will plan to allow permissive hypertension during initial phase of workup.  Patient's presentation concerning for worsening stroke or possible status epilepticus or new stroke.  Patient worked up with CT head and CTA head and neck which showed previously identified M1 MCA stroke with no new clear major vessel lesions on the imaging today.  Neurology was consulted upon patient's arrival and recommended admission to their service with plan for MRI brain and EEG to evaluate for possible status epilepticus.  Screening lab work obtained and patient's blood glucose was obtained and elevated at 138.  Patient admitted to neurology for further ongoing care.    I have reviewed the nursing notes. I have reviewed the findings, diagnosis, plan and  need for follow up with the patient.    New Prescriptions    No medications on file       Final diagnoses:   Ischemic stroke (H)   Altered mental status, unspecified altered mental status type       Lobo Gamez MD  [facility] EMERGENCY DEPARTMENT  2/9/2025     Lobo Gamez MD  02/09/25 1526

## 2025-02-09 NOTE — LETTER
Transition Communication Hand-off for Care Transitions to Next Level of Care Provider    Name: Gabriel Colorado  : 1960  MRN #: [id number]  Primary Care Provider: JOSÉ CARDENAS     Primary Clinic: [facility] [address]  [facility] [address]     Reason for Hospitalization:  Altered mental status, unspecified altered mental status type [R41.82]  Ischemic stroke (H) [I63.9]  Admit Date/Time: 2025 12:42 PM  Discharge Date:  2025  Payor Source: Payor: MEDICARE / Plan: MEDICARE / Product Type: Medicare /              Reason for Communication Hand-off Referral:   Notification of the discharge plan    Discharge Plan:           Care Management Discharge Note     Discharge Date: 2025        Discharge Disposition:  [facility] ([phone])     Discharge Services:  Return to long term care at [facility]     Discharge DME:    Not applicable at this time     Discharge Transportation: SHREYA spoke with pt's floor nurse (Cynthia) who states that pt will need stretcher transport to the receiving facility as pt's right side is out and pt does not have adequate trunk control to maintain an upright seated position.    SHREYA arranged for [facility] ([phone]) to provide stretcher transport with a service window  time of 2:40pm - 3:25pm.     Private pay costs discussed:   Not applicable at this time     Does the patient's insurance plan have a 3 day qualifying hospital stay waiver?  No     PAS Confirmation Code:    Not required as pt is returning to SNF of origin where bed was held  Patient/family educated on Medicare website which has current facility and service quality ratings:  No as pt is returning to SNF or origin where bed was held     Education Provided on the Discharge Plan:  Yes  Persons Notified of Discharge Plans:  Pt's son (Muas), [facility]A nursing and Dr. Rosa  Patient/Family in Agreement with the Plan:   Yes     Handoff Referral Completed: Yes, non-[facility] PCP:  "External handoff communication completed     Additional Information:  - Dr. Rosa confirmed readiness for discharge  - SW phoned Admissions (Zuly) at [facility] who confirmed that pt's bed was held, that pt is a long term care pt at the facility and who confirmed acceptance for re-admit  - SW faxed pt's completed discharge orders and discharge summary to [facility]  - SW completed (with [employer]  on the phone) \"Important Message from Medicare\" with pt's son (Musa) via phone call  - SW completed \"Physician's Certification for Transportation \" on line form  - SW has informed pt's floor nurse (Cynthia) that [facility] would like RN to RN report to [phone].     PEYTON Guadalupe  Social Work, 6A  Phone:  [phone]  Pager:  [phone]  2/11/2025        VITOR Merida                  "

## 2025-02-09 NOTE — ED TRIAGE NOTES
BIBA in from group home. Per EMS [facility] staff had noted that pt appeared to be presently differently than baseline and altered. Negative stroke assessment from EMS. Hx of strokes         Triage Assessment (Adult)       Row Name 02/09/25 1245          Triage Assessment    Airway WDL WDL        Respiratory WDL    Respiratory WDL WDL        Cardiac WDL    Cardiac WDL WDL        Cognitive/Neuro/Behavioral WDL    Cognitive/Neuro/Behavioral WDL arousability;orientation;speech;level of consciousness     Level of Consciousness unresponsive     Arousal Level unresponsive     Speech unable to speak

## 2025-02-09 NOTE — H&P
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 Patient sent to the ED by EMS this afternoon after being noticed to be less responsive to conversation and stimulation at home. LKW is yesterday afternoon/evening, before patient went to sleep for the night. Per chart review and d/w with daughter John, at his baseline, he is bedbound, aphasic with nonsensical speech, but can follow commands and use his left side. He was admitted Dec/2024 for L M1 occlusion s/p TICI 2a embolectomy and discharged on DAPT for 90 days.     On presentation to the ED, pt was not responding to speech, but withdrawing to pain in all 3 extremities. Resting comfortably in the bed. /98, glucose 138. We were unable to reach the group home for further collateral.     Intravenous Thrombolysis  Not given due to:   - unclear or unfavorable risk-benefit profile for extended window thrombolysis beyond the conventional 4.5 hour time window    Endovascular Treatment  Not initiated due to absence of proximal vessel occlusion    Impression   Gabriel Colorado is a 64 year old male with PMHx of HTN, DM2, history of segmental sigmoidectomy with hartmanns pouch s/p colostomy reversal in 2021, left M1 occlusion s/p TICI 2a recannnalization December 2024 with residual right sided hemibody weakness and aphasia presenting for evaluation of AMS of unclear etiology. Glucose 138, BP 150s systolic on presentation. Exam reveals NIH of 25 for inability to answer questions, weak withdrawal x4 extremities. No gaze deviation, pupils reactive but roving eye movements present. CBC, CMP unremarkable. CTH/CTA/CTP  redemonstrate left M1 occlusion, but compared to previous imaging from [facility] in December, overall perfusion to left M1 circulation is actually improved.     We will perform further evaluation of AMS with MRI Brain W/WO for possible ischemic etiology, overnight EEG monitoring and continued toxic-metabolic workup. Ordered 300mg ASA load rectally.     #AMS  #stable to improving M1 occlusion s/p TICI 2a recannalization Dec 2024  -Admit to neurology  -Permissive HTN; labetalol PRN for SBP > 220  -Telemetry, EKG  -Avoid hypotonic IV fluids  -Normothermia, euglycemia  -Bedside Glucose Monitoring  -A1c, Lipid Panel, Troponin x 3  -PT/OT/SLP  -NPO  -PM&R  -Stroke Education  -Depression Screen  -Apnea Screen  -Euthermia, Euglycemia  -Hold PTA donepzil,amantadine and prozac  -Will make decision regarding DAPT/antithrombotic regimen based on MRI Findings.   ----Rectal ASA 300mg ordered  -MRI Brain W/WO  -Overnight EEG    #HTN  Holding home lisinopril given concern for ischemic etiology    #HLD  -Holding atorvastatin 80mg and Zetia 10mg given NPO    #DM2  A1c 7.6%. On 12u glargine at home, held for now given NPO status.   -MDSSI  -Hold home metformin  -Hold home dapagliflozin    #GERD  -hold home famotidine as pt is NPO    #history of segmental sigmoidectomy with hartmanns pouch s/p colostomy reversal in 2021      Prophylaxis            For VTE Prevention:  - heparin SQ    For Acid Suppression:  - GI prophylaxis is not indicated    Code Status  Full Code based on prior code status during previous hospitalization.    During initial physical assessment, the plan of care discussion was not possible due to patient unable to participate and family not available at this time.    Patient was admitted via [facility] ED ([place])    The patient will be admitted to the Stroke team.     The Stroke Staff is Dr. Nunez.    Landon Prado MD  Neurology Resident  ___________________________________________________    Nutrition:  NPO  None    Clinically Significant Risk Factors Present on Admission                 # Drug Induced Platelet Defect: home medication list includes an antiplatelet medication   # Hypertension: Noted on problem list                   # Coma: based on GCS score of <8       Past Medical History   Past Medical History:   Diagnosis Date    Asthma     Depression     HTN (hypertension)      Past Surgical History   Past Surgical History:   Procedure Laterality Date    IR CAROTID CEREBRAL ANGIOGRAM BILATERAL  12/12/2024    IR LUMBAR PUNCTURE  6/14/2020    IR SPINAL PUNCTURE  6/14/2020    PICC INSERTION - TRIPLE LUMEN  5/9/2020          Medications   Home Meds  Prior to Admission medications    Medication Sig Start Date End Date Taking? Authorizing Provider   albuterol (PROAIR HFA;PROVENTIL HFA;VENTOLIN HFA) 90 mcg/actuation inhaler [ALBUTEROL (PROAIR HFA;PROVENTIL HFA;VENTOLIN HFA) 90 MCG/ACTUATION INHALER] Inhale 2 puffs every 4 (four) hours as needed.  9/26/19   Provider, Historical   amLODIPine-benazepril (LOTREL) 5-40 mg per capsule [AMLODIPINE-BENAZEPRIL (LOTREL) 5-40 MG PER CAPSULE] Take 1 capsule by mouth daily. 4/24/20   Provider, Historical   ARTHRITIS PAIN RELIEF, ACETAM, 650 mg CR tablet [ARTHRITIS PAIN RELIEF, ACETAM, 650 MG CR TABLET] Take 650 mg by mouth every 8 (eight) hours as needed. 4/24/20   Provider, Historical   aspirin 81 MG EC tablet [ASPIRIN 81 MG EC TABLET] Take 81 mg by mouth daily. 9/26/19   Provider, Historical   atorvastatin (LIPITOR) 40 MG tablet [ATORVASTATIN (LIPITOR) 40 MG TABLET] Take 1 tablet (40 mg total) by mouth every evening. 6/23/20   Marissa Urbina, DO   cetirizine (ZYRTEC) 10 MG tablet [CETIRIZINE (ZYRTEC) 10 MG TABLET] Take 10 mg by mouth daily. 9/26/19   Provider, Historical   metFORMIN (GLUCOPHAGE-XR) 500 MG 24 hr tablet [METFORMIN (GLUCOPHAGE-XR) 500 MG 24 HR TABLET] Take 500 mg by mouth daily. 4/24/20   Provider, Historical   traZODone (DESYREL) 100 MG tablet [TRAZODONE (DESYREL)  100 MG TABLET] Take 1 tablet (100 mg total) by mouth at bedtime as needed for sleep. 6/23/20   Marissa Urbina,    VITAMIN D3 50 mcg (2,000 unit) capsule [VITAMIN D3 50 MCG (2,000 UNIT) CAPSULE] Take 2,000 Units by mouth daily. 4/24/20   Provider, Historical       Scheduled Meds  No current facility-administered medications for this encounter.       Infusion Meds  No current facility-administered medications for this encounter.       PRN Meds  No current facility-administered medications for this encounter.       Allergies   Allergies   Allergen Reactions    Vancomycin Rash     Family History   No family history on file.  Social History        Review of Systems   Review of systems not obtained due to patient factors - confusion       PHYSICAL EXAMINATION  Pulse:  [82] 82  Resp:  [18] 18  BP: (153-168)/(84-98) 153/98  SpO2:  [97 %-98 %] 97 %    General:  patient lying in bed without any acute distress    HEENT:  normocephalic/atraumatic. No neck stiffness  Cardio:  Extremities warm and well perfused  Pulmonary:  no respiratory distress  Abdomen:  soft  Extremities:  no edema  Skin:  intact     Neurologic  Mental Status:  Somnolent, moans occasionally to pain. Does not follow commands.   Cranial Nerves:  Pupils midline, reactive bilaterally, roving eye movements noted. Possible facial asymmetry with L droop, but difficult to appreciate without baseline or participation in exam.   Motor:  Tone increased in R arm, but normal in LUE. Withdraws to pain in all 4 extremities. LUE may be antigravity.   Reflexes:  Not assessed.  Sensory:  Intact to pain in all 4 extremities  Coordination:  Unable to assess  Station/Gait:  deferred    Dysphagia Screen  Dysarthria or facial droop present - Maintain NPO, consult SLP    Stroke Scales    NIHSS  1a. Level of Consciousness 2-->Not alert, requires repeated stimulation to attend, or is obtunded and requires strong or painful stimulation to make movements (not stereotyped)   1b. LOC  Questions 2-->Answers neither question correctly   1c. LOC Commands 2-->Performs neither task correctly   2.   Best Gaze 0-->Normal   3.   Visual 0-->No visual loss   4.   Facial Palsy 1-->Minor paralysis (flattened nasolabial fold, asymmetry on smiling)   5a. Motor Arm, Left 3-->No effort against gravity, limb falls   5b. Motor Arm, Right 3-->No effort against gravity, limb falls   6a. Motor Leg, Left 3-->No effort against gravity, leg falls to bed immediately   6b. Motor Leg, right 3-->No effort against gravity, leg falls to bed immediately   7.   Limb Ataxia 0-->Absent   8.   Sensory 1-->Mild-to-moderate sensory loss, patient feels pinprick is less sharp or is dull on the affected side, or there is a loss of superficial pain with pinprick, but patient is aware of being touched   9.   Best Language 3-->Mute, global aphasia, no usable speech or auditory comprehension   10. Dysarthria 2-->Severe dysarthria, patients speech is so slurred as to be unintelligible in the absence of or out of proportion to any dysphasia, or is mute/anarthric   11. Extinction and Inattention  0-->No abnormality   Total 25 (02/09/25 1300)       Modified [place] Score (Pre-morbid)  4-5    Imaging  I personally reviewed all imaging; relevant findings per the HPI.    Lab Results Data   CBC  Recent Labs   Lab 02/09/25  1322   WBC 8.6   RBC 5.16   HGB 15.4   HCT 47.0        Basic Metabolic Panel   Recent Labs   Lab 02/09/25  1322 02/09/25  1317 02/09/25  1256     --   --    POTASSIUM 3.6  --   --    CHLORIDE 103  --   --    CO2 24  --   --    BUN 18.3  --   --    CR 0.66* 0.7  --    *  --  138*   JANNETTE 9.9  --   --      Liver Panel  Recent Labs   Lab 02/09/25  1322   PROTTOTAL 7.5   ALBUMIN 4.2   BILITOTAL 0.5   ALKPHOS 81   AST 25   ALT 45     INR    Recent Labs   Lab Test 02/09/25  1322 06/04/20  1751 05/10/20  1334   INR 0.94 1.01 1.02      Lipid Profile    Recent Labs   Lab Test 06/17/20  0516 06/07/20  0500 06/06/20  1742  "06/05/20  0427 05/11/20  0522 10/25/19  0828 09/07/18  0915   CHOL  --   --  223*  --   --  198 278*   HDL  --   --  33*  --   --  42 57   LDL  --   --  150* 168*  --  120 198*   TRIG 173* 196* 200*  --    < > 180* 113    < > = values in this interval not displayed.     A1C    Recent Labs   Lab Test 05/10/20  0438   A1C 6.6*     Troponin  No results for input(s): \"CTROPT\", \"TROPONINIS\", \"TROPONINI\", \"GHTROP\" in the last 168 hours.       Stroke Code / Stroke Consult Data Data    Not a stroke code       "

## 2025-02-09 NOTE — PHARMACY-ADMISSION MEDICATION HISTORY
Pharmacist Admission Medication History    Admission medication history is complete. The information provided in this note is only as accurate as the sources available at the time of the update.    Information Source(s): Facility ([facility] medication list/MAR via  [facility]    Pertinent Information: patient had been on amlodipine 5mg daily, however this was discontinued 2-3-25 per MAR    Changes made to PTA medication list:  Added:   Insulin glargine  Amantadine   Lisinopril  Clopidogrel  Dapagliflozin  Donepezil  Ezetimibe  Famotidine  Fluoxetine    Deleted:   Amlodipine-benazepril 5-40mg by mouth daily  Albuterol inhaler 2 puffs every 4 hours as needed  Cholecalciferol 50mcg by mouth daily    Changed:   Atorvastatin 40mg by mouth every evening-->80mg by mouth every evening  Trazodone 100mg by mouth at bedtime as needed-->50mg by mouth every evening  Metformin ER 500mg by mouth with supper-->metformin 1000mg by mouth twice daily with meals  Acetaminophen 650mg CR every 8 hours as needed-->acetaminophen 650mg by mouth 3 times daily    Allergies reviewed with patient and updates made in EHR: yes    Medication History Completed By: Parth Mckeon RPH 2/9/2025 3:47 PM    PTA Med List   Medication Sig Last Dose/Taking    acetaminophen (TYLENOL) 325 MG tablet Take 650 mg by mouth 3 times daily. 2/8/2025 at  8:00 PM    amantadine (SYMMETREL) 100 MG capsule Take 100 mg by mouth daily. 2/8/2025 Morning    aspirin 81 MG EC tablet Take 81 mg by mouth every evening. 2/8/2025 at  6:00 PM    atorvastatin (LIPITOR) 80 MG tablet Take 80 mg by mouth every evening. 2/8/2025 Evening    cetirizine (ZYRTEC) 10 MG tablet [CETIRIZINE (ZYRTEC) 10 MG TABLET] Take 10 mg by mouth daily. 2/8/2025 Morning    clopidogrel (PLAVIX) 75 MG tablet Take 75 mg by mouth daily. 2/8/2025 Morning    dapagliflozin (FARXIGA) 10 MG TABS tablet Take 10 mg by mouth daily. 2/8/2025 Morning    donepezil (ARICEPT) 5 MG tablet Take 5 mg by mouth  every evening. 2/8/2025 at  6:00 PM    ezetimibe (ZETIA) 10 MG tablet Take 10 mg by mouth at bedtime. 2/8/2025 at  8:00 PM    famotidine (PEPCID) 40 MG tablet Take 40 mg by mouth every evening. 2/8/2025 Evening    FLUoxetine (PROZAC) 20 MG capsule Take 20 mg by mouth daily. 2/8/2025 Morning    insulin glargine (LANTUS PEN) 100 UNIT/ML pen Inject 12 Units subcutaneously every evening. 2/8/2025 at  8:00 PM    lisinopril (ZESTRIL) 5 MG tablet Take 5 mg by mouth daily. 2/8/2025 Morning    metFORMIN (GLUCOPHAGE) 1000 MG tablet Take 1,000 mg by mouth 2 times daily (with meals). 2/8/2025 at  5:00 PM    traZODone (DESYREL) 50 MG tablet Take 50 mg by mouth every evening. 2/8/2025 at  8:00 PM

## 2025-02-10 ENCOUNTER — APPOINTMENT (OUTPATIENT)
Dept: NEUROLOGY | Facility: CLINIC | Age: 65
DRG: 948 | End: 2025-02-10
Payer: MEDICARE

## 2025-02-10 LAB
ALBUMIN SERPL BCG-MCNC: 3.5 G/DL (ref 3.5–5.2)
ALP SERPL-CCNC: 67 U/L (ref 40–150)
ALT SERPL W P-5'-P-CCNC: 36 U/L (ref 0–70)
AMMONIA PLAS-SCNC: 21 UMOL/L (ref 16–60)
ANION GAP SERPL CALCULATED.3IONS-SCNC: 12 MMOL/L (ref 7–15)
AST SERPL W P-5'-P-CCNC: 27 U/L (ref 0–45)
BILIRUB DIRECT SERPL-MCNC: <0.2 MG/DL (ref 0–0.3)
BILIRUB SERPL-MCNC: 0.4 MG/DL
BUN SERPL-MCNC: 18.3 MG/DL (ref 8–23)
CALCIUM SERPL-MCNC: 9.4 MG/DL (ref 8.8–10.4)
CHLORIDE SERPL-SCNC: 106 MMOL/L (ref 98–107)
CREAT SERPL-MCNC: 0.7 MG/DL (ref 0.67–1.17)
EGFRCR SERPLBLD CKD-EPI 2021: >90 ML/MIN/1.73M2
ERYTHROCYTE [DISTWIDTH] IN BLOOD BY AUTOMATED COUNT: 14.2 % (ref 10–15)
GLUCOSE BLDC GLUCOMTR-MCNC: 105 MG/DL (ref 70–99)
GLUCOSE BLDC GLUCOMTR-MCNC: 112 MG/DL (ref 70–99)
GLUCOSE BLDC GLUCOMTR-MCNC: 156 MG/DL (ref 70–99)
GLUCOSE BLDC GLUCOMTR-MCNC: 81 MG/DL (ref 70–99)
GLUCOSE SERPL-MCNC: 120 MG/DL (ref 70–99)
HCO3 SERPL-SCNC: 24 MMOL/L (ref 22–29)
HCT VFR BLD AUTO: 42.6 % (ref 40–53)
HGB BLD-MCNC: 13.8 G/DL (ref 13.3–17.7)
MCH RBC QN AUTO: 30.1 PG (ref 26.5–33)
MCHC RBC AUTO-ENTMCNC: 32.4 G/DL (ref 31.5–36.5)
MCV RBC AUTO: 93 FL (ref 78–100)
PLATELET # BLD AUTO: 292 10E3/UL (ref 150–450)
POTASSIUM SERPL-SCNC: 3.3 MMOL/L (ref 3.4–5.3)
POTASSIUM SERPL-SCNC: 3.8 MMOL/L (ref 3.4–5.3)
PROT SERPL-MCNC: 6.3 G/DL (ref 6.4–8.3)
RBC # BLD AUTO: 4.59 10E6/UL (ref 4.4–5.9)
SODIUM SERPL-SCNC: 142 MMOL/L (ref 135–145)
WBC # BLD AUTO: 9.4 10E3/UL (ref 4–11)

## 2025-02-10 PROCEDURE — 250N000011 HC RX IP 250 OP 636

## 2025-02-10 PROCEDURE — 85048 AUTOMATED LEUKOCYTE COUNT: CPT

## 2025-02-10 PROCEDURE — 36415 COLL VENOUS BLD VENIPUNCTURE: CPT

## 2025-02-10 PROCEDURE — 250N000011 HC RX IP 250 OP 636: Performed by: STUDENT IN AN ORGANIZED HEALTH CARE EDUCATION/TRAINING PROGRAM

## 2025-02-10 PROCEDURE — 82140 ASSAY OF AMMONIA: CPT

## 2025-02-10 PROCEDURE — 80048 BASIC METABOLIC PNL TOTAL CA: CPT

## 2025-02-10 PROCEDURE — 95714 VEEG EA 12-26 HR UNMNTR: CPT

## 2025-02-10 PROCEDURE — 99233 SBSQ HOSP IP/OBS HIGH 50: CPT | Mod: 25 | Performed by: STUDENT IN AN ORGANIZED HEALTH CARE EDUCATION/TRAINING PROGRAM

## 2025-02-10 PROCEDURE — 250N000013 HC RX MED GY IP 250 OP 250 PS 637

## 2025-02-10 PROCEDURE — 85014 HEMATOCRIT: CPT

## 2025-02-10 PROCEDURE — 80053 COMPREHEN METABOLIC PANEL: CPT

## 2025-02-10 PROCEDURE — 120N000002 HC R&B MED SURG/OB UMMC

## 2025-02-10 PROCEDURE — 82248 BILIRUBIN DIRECT: CPT

## 2025-02-10 PROCEDURE — 258N000003 HC RX IP 258 OP 636

## 2025-02-10 PROCEDURE — 84132 ASSAY OF SERUM POTASSIUM: CPT | Performed by: STUDENT IN AN ORGANIZED HEALTH CARE EDUCATION/TRAINING PROGRAM

## 2025-02-10 PROCEDURE — 36415 COLL VENOUS BLD VENIPUNCTURE: CPT | Performed by: STUDENT IN AN ORGANIZED HEALTH CARE EDUCATION/TRAINING PROGRAM

## 2025-02-10 RX ORDER — LIDOCAINE HYDROCHLORIDE 20 MG/ML
JELLY TOPICAL ONCE
Status: COMPLETED | OUTPATIENT
Start: 2025-02-10 | End: 2025-02-10

## 2025-02-10 RX ORDER — POTASSIUM CHLORIDE 7.45 MG/ML
10 INJECTION INTRAVENOUS ONCE
Status: DISCONTINUED | OUTPATIENT
Start: 2025-02-10 | End: 2025-02-10

## 2025-02-10 RX ORDER — DEXTROSE MONOHYDRATE AND SODIUM CHLORIDE 5; .9 G/100ML; G/100ML
INJECTION, SOLUTION INTRAVENOUS CONTINUOUS
Status: DISPENSED | OUTPATIENT
Start: 2025-02-10 | End: 2025-02-10

## 2025-02-10 RX ORDER — POTASSIUM CHLORIDE 7.45 MG/ML
10 INJECTION INTRAVENOUS
Status: COMPLETED | OUTPATIENT
Start: 2025-02-10 | End: 2025-02-10

## 2025-02-10 RX ORDER — ASPIRIN 300 MG/1
300 SUPPOSITORY RECTAL DAILY
Status: DISCONTINUED | OUTPATIENT
Start: 2025-02-10 | End: 2025-02-10

## 2025-02-10 RX ORDER — METOCLOPRAMIDE HYDROCHLORIDE 5 MG/ML
10 INJECTION INTRAMUSCULAR; INTRAVENOUS ONCE
Status: COMPLETED | OUTPATIENT
Start: 2025-02-10 | End: 2025-02-10

## 2025-02-10 RX ADMIN — DONEPEZIL HYDROCHLORIDE 5 MG: 5 TABLET, FILM COATED ORAL at 20:47

## 2025-02-10 RX ADMIN — POTASSIUM CHLORIDE 10 MEQ: 7.46 INJECTION, SOLUTION INTRAVENOUS at 13:26

## 2025-02-10 RX ADMIN — ENOXAPARIN SODIUM 40 MG: 40 INJECTION SUBCUTANEOUS at 18:47

## 2025-02-10 RX ADMIN — ATORVASTATIN CALCIUM 80 MG: 80 TABLET, FILM COATED ORAL at 20:47

## 2025-02-10 RX ADMIN — DEXTROSE AND SODIUM CHLORIDE: 5; 900 INJECTION, SOLUTION INTRAVENOUS at 03:35

## 2025-02-10 RX ADMIN — POTASSIUM CHLORIDE 10 MEQ: 7.46 INJECTION, SOLUTION INTRAVENOUS at 12:08

## 2025-02-10 RX ADMIN — POTASSIUM CHLORIDE 10 MEQ: 7.46 INJECTION, SOLUTION INTRAVENOUS at 16:55

## 2025-02-10 RX ADMIN — POTASSIUM CHLORIDE 10 MEQ: 7.46 INJECTION, SOLUTION INTRAVENOUS at 14:45

## 2025-02-10 RX ADMIN — FAMOTIDINE 40 MG: 20 TABLET, FILM COATED ORAL at 20:47

## 2025-02-10 ASSESSMENT — ACTIVITIES OF DAILY LIVING (ADL)
ADLS_ACUITY_SCORE: 75
ADLS_ACUITY_SCORE: 79
ADLS_ACUITY_SCORE: 75
ADLS_ACUITY_SCORE: 79
ADLS_ACUITY_SCORE: 75
ADLS_ACUITY_SCORE: 79
ADLS_ACUITY_SCORE: 79
ADLS_ACUITY_SCORE: 75
ADLS_ACUITY_SCORE: 79
ADLS_ACUITY_SCORE: 79
ADLS_ACUITY_SCORE: 75
ADLS_ACUITY_SCORE: 79
ADLS_ACUITY_SCORE: 75
ADLS_ACUITY_SCORE: 79

## 2025-02-10 NOTE — PLAN OF CARE
Status: Admitt for AMS, overnight EEG monitoring and continued toxic-metabolic workup. PMHx: HTN, DM2, history of segmental sigmoidectomy, left M1 occlusion s/p TICI 2a recannnalization 12/2024 with residual right sided hemibody weakness & aphasia. *seiz precau.  Vitals: VSS on RA - con pulse ox in place & CCM donned. SBP para 100-220.  Neuros: MORGAN orientation. Lethargic. Unarousable - only slightly withdrawing to painful stimuli in BUE. Hmong speaking at baseline. Utilization of interpretor services on phone does not help promote arousability. Strengths 1/5 thru out. NIHSS 27-25.  IV: PIV infusing K+ replacement then tko w/ NS.  Labs/Electrolytes: K+ 3.3 this AM - IV K replacement started. Redraw this andres @1812. BG checks discontinued this pm by MD. Insulin held.  Resp: LSC - no s/sx of SOB.   Diet: STRICT NPO - SLP visit this AM deemed pt was unsafe for swallow study. Neurology currently brainstorming nutrition route.   GI: Blad scan @12pm for 197. 1x incont.  : LBM PTA. Active BS thru out all quadrants.   Skin: L knee abrasion. Abdominal scarring at baseline.   Pain: CPOT 0.  Activity: Ax2/lift/repo q2hr.   Social: No calls or visitors witnessed by RN this shift.   Plan: Team working on nutrition route if PEG is a potential option. Pt prior to hospital stay had possibly exhibited poor nutrition intake per neurology team when RN rounded with them this AM. SLP tried 2x today for swallow study but unable to complete d/t pt's mentation. PT/OT unable to deem pt therapeutic approp for working today d/t orient - will check back. EEG leads donned today.   Updates this shift: SW verbally reported to RN that pt is from a skilled nursing facility: [facility].   Education completed: RN recommends continuing verbalizing care interventions with patient to continual trial arousability.   Communication:   -paged stroke team @0808: Hi. Friendly FYI. Pt's BP is 115/64. ordered para is [id number]. Thanks! *response:  SBP para changed to 110-220 and updated RN that Gen Neuro will be primary for pt vs stroke team.    -messaged team via [employer] chat @[phone]: Any updates on if pt will have continuous fluids or possible PEG placement? SLP tried twice today to attempt swallow but pt is still not therapeutically alert enough to trial swallow. *PEG placement is pending discussion with family about who makes his health care decisions and what his goals are. Team is attempting to reach family this pm.    Goal Outcome Evaluation:      Plan of Care Reviewed With: patient    Overall Patient Progress: no change    Outcome Evaluation: VSS and promoted arousability.

## 2025-02-10 NOTE — PROGRESS NOTES
Attempted intermittent cath. Pt became agitated during cath. No return of urine. Bladder scan showed 10 mL.

## 2025-02-10 NOTE — PLAN OF CARE
SLP:    Attempted to see pt x2 during day, however pt unarousable. Pt not appropriate for swallow evaluation today. SLP will reattempt as able.

## 2025-02-10 NOTE — PROVIDER NOTIFICATION
EEG attempting to place the monitor on his head, patient is keep on removing the wire, do you want order, restraints? Neurology resident stroke team notified.

## 2025-02-10 NOTE — PROGRESS NOTES
Arrived from: ED 2200  Belongings/meds: with pt  2 RN Skin Assessment Completed by: Jayesh/Tyree  Skin Findings: RLE redness shin knee.  Non-intact findings documented (yes/no/NA): yes

## 2025-02-10 NOTE — PLAN OF CARE
Goal Outcome Evaluation:      Plan of Care Reviewed With: patient    Overall Patient Progress: no changeOverall Patient Progress: no change     Status: PMHx of HTN, DM2, history of segmental sigmoidectomy, left M1 occlusion s/p TICI 2a recannnalization December 2024 with residual right sided hemibody weakness and aphasia. Admitted for AMS, overnight EEG monitoring and continued toxic-metabolic workup.  Vitals: VSS CCM NSR. On RA.  Neuros: Alert/lethargic. Arouses to gentle touch. Uncooperative with cares.  Right side 1/5 with decreased sensation. MORGAN orientation. Left side localizes to pain. Hmong speaking. Unable to use  services d/t mental status. NIH 25- 21  IV: PIV SL  Labs/Electrolytes: AM draws. BG check with insulin q4h.  Resp: On RA. LS dim. Occasional congested cough.  Diet: Strict NPO  GI:  PTA BM  : No incontinence overnight. Bladder scanned for 35ml at 03:30, MD updated. Fluids started. Bladder scanned again for 107ml after D5NS bolus, at 06:00.  Skin: Old abdominal surgical scaring. Right leg redness to knee and shin.  Pain: No s/sx pain.  Activity: Up 2/lift. T/R q2h.  Plan: Plan EEG hook up this morning. PT/OT ans SLP ethan.

## 2025-02-10 NOTE — PROGRESS NOTES
[facility]  General Neurology - Progress Note    Patient Name:  Gabriel Colorado  Date of Service:  February 10, 2025    Subjective:    No acute overnight events. EEG not done overnight due to patient being agitated. EEG will be done this morning.     Objective:    Vitals: BP 93/66 (BP Location: Right arm)   Pulse 65   Temp 97.7  F (36.5  C) (Oral)   Resp 14   SpO2 97%   General:  patient lying in bed without any acute distress    HEENT:  normocephalic/atraumatic. EEG leads in place.  Cardio:  Extremities warm and well perfused  Pulmonary: breathing comfortably on room air  Extremities:  no edema  Skin: intact, birthmark vs scar on L chest      Neurologic  Mental Status: Awakens to noxious stimulation though forcefully closes eyes when attempting to have them passively opened. No attempt to speak, no attempt to follow commands.  Does not follow commands or participate in exam. Does withdraw to pain in all extremities. Exam conducted with Propanc  via telephone at bedside.   Cranial Nerves:  Pupils midline, reactive bilaterally, face appears symmetric but exam limited.   Motor:  Tone increased in R arm, but normal in LUE. Does withdraw to pain in all extremities.  Reflexes: Positive babinski sign right , negative on L   Sensory: Does withdraw to pain in all extremities.  Coordination:  Unable to assess  Station/Gait:  unable to assess due to cooperation    Pertinent Investigations:      MR Brain w/wo contrast:  Impression:  1. No definite acute infarct demonstrated on diffusion weighted  imaging within the ischemic territory demonstrated on earlier same day  perfusion imaging. Given the left MCA distribution parenchymal volume  loss and vessel collateralization, findings are suggestive of chronic  left MCA occlusion.   2. Postcontrast enhancement through the dorsal left basal ganglia,  corona radiata and centrum semiovale in the regions of previously  described acute  "infarcts on the outside report dated 12/13/2024,  compatible with now late subacute to chronic infarct.  3. Sequela of chronic small vessel ischemic disease.    I have personally reviewed most recent and pertinent labs, tests, and radiological images.     Assessment/Plan:  Gabriel Colorado is a 64 year old male with PMHx of HTN, DM2, history of segmental sigmoidectomy with hartmanns pouch s/p colostomy reversal in 2021, left M1 occlusion s/p TICI 2a recannnalization December 2024 with residual right sided hemiparesis and aphasia presenting 2/10/2025 from skilled nursing facility due to acute change in mental status and was admitted for altered mental status.     #Acute encephalopathy   #Left M1 occlusion MCA territory s/p thrombectomy and partial reperfusion (12/2024)  Pt presented 12/2024 after being found down at home, was found to have L MCA infarct that was amenable to thrombectomy (~40% revascularization). He discharged 1/6/2025 to TCU with hopes to return home with family after therapy and ability to complete some ADLs. Unfortunately patient has been requiring 24 hour cares since admission to TCU. On 2/9/25, patient was noted to be more somnolent and \"completely unresponsive\" - a change compared to baseline. Therefore EMS was called and patient was brought to [facility].   Upon arrival, patient was minimally responsive. Electrolytes were WNL. MRI brain did not show any evidence of an acute infarct. He was admitted for further evaluation. Toxic/metabolic workup negative. Infectious workup largely negative. Etiology may also be psychologic / behavioral given he does actively close eyes against force on exam and report from SNF that he at times chooses not to participate with exam. Given his history there is concern for seizure but initial EEG evaluation without evidence of epileptiform activity.  Overall it is not clear what is causing his current presentation although it does not appear significantly changed from baseline. " Will complete work-up for acute encephalopathy and consider patient returning to TCU vs LTC.   -Normotension, goal sbp < 180  -Telemetry  -EEG started AM 2/10/2025  -PT/OT/SLP consulted, patient not currently participating in therapy   -Continue NPO until speech evaluation    -holding oral medications without IV alternative (PTA donepezil, amantadine, prozac, DAPT)  -Rectal aspirin 300 mg daily while NPO without feeding tube     #HTN  On lisinopril PTA. Patient with labile BP on admission, up to 148/75 and down to 93/66. Will hold home lisinopril for now. PRN labetalol available for SBP > 180.  - Holding home lisinopril d/t NPO status and borderline low blood pressures      #HLD  - Holding atorvastatin 80mg and ezetimibe 10mg while NPO     #DM2  A1c 7.6%. On 12u glargine, metformin, and dapagliflozin at home, held while NPO and inpatient. Initially on medium-dose sliding scale but held as patientw as not receiving any insulin or caloric intake.   -Hold home glargine 12 units   -Hold home metformin  -Hold home dapagliflozin     #GERD  -hold home famotidine as pt is NPO     #malnutrition  #poor PO intake   Patient reported to have poor PO intake throughout previous admission as well as while at TCU. He was approved for a thickened liquid diet and did seem preferential towards food brought in from home and served by family. Because of his overall poor nutritional status, supplemental nutrition via PEG tube has been discussed with family but no definitive choices have been made. Upon arrival to [facility], patient minimally responsive and not able to participate with speech therapy evaluation. Patient remains NPO until evaluation complete.   -ongoing C discussion with family regarding nutritional status and delivery of oral medications     FEN: electrolyte replacement protocols, currently NPO   PPx: enoxaparin   Code: Full code     Patient was seen and discussed with Dr. Mobley.    Janis Palacios       Resident/Fellow  Attestation   I, Kelly Lares MD, was present with the medical/MIKAYLA student who participated in the service and in the documentation of the note.  I have verified the history and personally performed the physical exam and medical decision making.  I agree with the assessment and plan of care as documented in the note.      Spoke with patient's son, Musa, via phone. Discussed patient's presentation and current status of evaluation. Described patient's lack of responsiveness vs cooperation with exam and as to whether or not his current presentation is an acute change versus presentation on the spectrum of his disease. We did talk about the inability to provide Mr. Colorado with nutrition and oral medications at this time. The son agreed for temporary placement of an NG tube for supplemental nutrition and med administration. Son will discuss with the remainder of family regarding next steps if patient continues to have poor PO intake and whether or not a permanent feeding tube (eg PEG) would be acceptable.     To note patient is at least partially deaf, per chart review R worse than L.     Family contacts:  Musa Colorado (Son) -- [phone] (speaks English)  Elodia Heredia (significant other) -- [phone] -- Musa will relay information to LAINEY Clifton to give her updates. Not surrogate decision maker. Needs [facility] .       Kelly Lares MD, PhD  PGY1 Internal Medicine  General Neurology Service   Date of Service (when I saw the patient): 02/10/25

## 2025-02-11 ENCOUNTER — APPOINTMENT (OUTPATIENT)
Dept: NEUROLOGY | Facility: CLINIC | Age: 65
DRG: 948 | End: 2025-02-11
Payer: MEDICARE

## 2025-02-11 ENCOUNTER — VIRTUAL VISIT (OUTPATIENT)
Dept: INTERPRETER SERVICES | Facility: CLINIC | Age: 65
DRG: 948 | End: 2025-02-11
Payer: MEDICARE

## 2025-02-11 VITALS
HEART RATE: 88 BPM | RESPIRATION RATE: 16 BRPM | DIASTOLIC BLOOD PRESSURE: 75 MMHG | BODY MASS INDEX: 26.13 KG/M2 | SYSTOLIC BLOOD PRESSURE: 150 MMHG | TEMPERATURE: 98.4 F | WEIGHT: 141.98 LBS | OXYGEN SATURATION: 96 % | HEIGHT: 62 IN

## 2025-02-11 LAB
ANION GAP SERPL CALCULATED.3IONS-SCNC: 14 MMOL/L (ref 7–15)
BUN SERPL-MCNC: 25 MG/DL (ref 8–23)
CALCIUM SERPL-MCNC: 8.5 MG/DL (ref 8.8–10.4)
CHLORIDE SERPL-SCNC: 107 MMOL/L (ref 98–107)
CREAT SERPL-MCNC: 0.72 MG/DL (ref 0.67–1.17)
EGFRCR SERPLBLD CKD-EPI 2021: >90 ML/MIN/1.73M2
ERYTHROCYTE [DISTWIDTH] IN BLOOD BY AUTOMATED COUNT: 14.3 % (ref 10–15)
GLUCOSE BLDC GLUCOMTR-MCNC: 148 MG/DL (ref 70–99)
GLUCOSE BLDC GLUCOMTR-MCNC: 189 MG/DL (ref 70–99)
GLUCOSE SERPL-MCNC: 146 MG/DL (ref 70–99)
HCO3 SERPL-SCNC: 20 MMOL/L (ref 22–29)
HCT VFR BLD AUTO: 39 % (ref 40–53)
HGB BLD-MCNC: 13.1 G/DL (ref 13.3–17.7)
MAGNESIUM SERPL-MCNC: 1.6 MG/DL (ref 1.7–2.3)
MCH RBC QN AUTO: 30.3 PG (ref 26.5–33)
MCHC RBC AUTO-ENTMCNC: 33.6 G/DL (ref 31.5–36.5)
MCV RBC AUTO: 90 FL (ref 78–100)
PHOSPHATE SERPL-MCNC: 2.3 MG/DL (ref 2.5–4.5)
PLATELET # BLD AUTO: 321 10E3/UL (ref 150–450)
POTASSIUM SERPL-SCNC: 3.7 MMOL/L (ref 3.4–5.3)
RBC # BLD AUTO: 4.33 10E6/UL (ref 4.4–5.9)
SODIUM SERPL-SCNC: 141 MMOL/L (ref 135–145)
WBC # BLD AUTO: 6.6 10E3/UL (ref 4–11)

## 2025-02-11 PROCEDURE — 95718 EEG PHYS/QHP 2-12 HR W/VEEG: CPT | Performed by: PSYCHIATRY & NEUROLOGY

## 2025-02-11 PROCEDURE — 83735 ASSAY OF MAGNESIUM: CPT | Performed by: STUDENT IN AN ORGANIZED HEALTH CARE EDUCATION/TRAINING PROGRAM

## 2025-02-11 PROCEDURE — 250N000013 HC RX MED GY IP 250 OP 250 PS 637

## 2025-02-11 PROCEDURE — 36415 COLL VENOUS BLD VENIPUNCTURE: CPT

## 2025-02-11 PROCEDURE — 999N000147 HC STATISTIC PT IP EVAL DEFER

## 2025-02-11 PROCEDURE — 250N000013 HC RX MED GY IP 250 OP 250 PS 637: Performed by: STUDENT IN AN ORGANIZED HEALTH CARE EDUCATION/TRAINING PROGRAM

## 2025-02-11 PROCEDURE — 95711 VEEG 2-12 HR UNMONITORED: CPT

## 2025-02-11 PROCEDURE — 99239 HOSP IP/OBS DSCHRG MGMT >30: CPT | Mod: 25 | Performed by: STUDENT IN AN ORGANIZED HEALTH CARE EDUCATION/TRAINING PROGRAM

## 2025-02-11 PROCEDURE — 92610 EVALUATE SWALLOWING FUNCTION: CPT | Mod: GN | Performed by: SPEECH-LANGUAGE PATHOLOGIST

## 2025-02-11 PROCEDURE — T1013 SIGN LANG/ORAL INTERPRETER: HCPCS | Mod: GT,TEL,95 | Performed by: INTERPRETER

## 2025-02-11 PROCEDURE — 97165 OT EVAL LOW COMPLEX 30 MIN: CPT | Mod: GO

## 2025-02-11 PROCEDURE — 80048 BASIC METABOLIC PNL TOTAL CA: CPT

## 2025-02-11 PROCEDURE — 97530 THERAPEUTIC ACTIVITIES: CPT | Mod: GO

## 2025-02-11 PROCEDURE — 92526 ORAL FUNCTION THERAPY: CPT | Mod: GN | Performed by: SPEECH-LANGUAGE PATHOLOGIST

## 2025-02-11 PROCEDURE — 85014 HEMATOCRIT: CPT

## 2025-02-11 PROCEDURE — 84100 ASSAY OF PHOSPHORUS: CPT | Performed by: STUDENT IN AN ORGANIZED HEALTH CARE EDUCATION/TRAINING PROGRAM

## 2025-02-11 RX ORDER — AMLODIPINE AND BENAZEPRIL HYDROCHLORIDE 5; 40 MG/1; MG/1
1 CAPSULE ORAL DAILY
Qty: 30 CAPSULE | Refills: 0 | Status: SHIPPED | OUTPATIENT
Start: 2025-02-11 | End: 2025-03-13

## 2025-02-11 RX ORDER — ACETAMINOPHEN 325 MG/1
650 TABLET ORAL EVERY 6 HOURS PRN
Status: DISCONTINUED | OUTPATIENT
Start: 2025-02-11 | End: 2025-02-11 | Stop reason: HOSPADM

## 2025-02-11 RX ADMIN — POTASSIUM & SODIUM PHOSPHATES POWDER PACK 280-160-250 MG 1 PACKET: 280-160-250 PACK at 10:54

## 2025-02-11 RX ADMIN — ASPIRIN 81 MG: 81 TABLET ORAL at 09:13

## 2025-02-11 RX ADMIN — FLUOXETINE HYDROCHLORIDE 20 MG: 20 CAPSULE ORAL at 09:13

## 2025-02-11 RX ADMIN — CLOPIDOGREL BISULFATE 75 MG: 75 TABLET ORAL at 09:13

## 2025-02-11 RX ADMIN — POTASSIUM & SODIUM PHOSPHATES POWDER PACK 280-160-250 MG 1 PACKET: 280-160-250 PACK at 13:46

## 2025-02-11 RX ADMIN — ACETAMINOPHEN 650 MG: 325 TABLET, FILM COATED ORAL at 09:29

## 2025-02-11 RX ADMIN — AMANTADINE 100 MG: 100 CAPSULE ORAL at 09:12

## 2025-02-11 ASSESSMENT — ACTIVITIES OF DAILY LIVING (ADL)
ADLS_ACUITY_SCORE: 79
ADLS_ACUITY_SCORE: 80
ADLS_ACUITY_SCORE: 79
ADLS_ACUITY_SCORE: 79
ADLS_ACUITY_SCORE: 80
ADLS_ACUITY_SCORE: 79
ADLS_ACUITY_SCORE: 79
ADLS_ACUITY_SCORE: 80
ADLS_ACUITY_SCORE: 80
ADLS_ACUITY_SCORE: 79
ADLS_ACUITY_SCORE: 80
ADLS_ACUITY_SCORE: 79

## 2025-02-11 NOTE — INTERIM SUMMARY
Brief neurology progress note.    Patient was placed on nonviolent restraints temporarily as he was pulling off EEG leads during study.  EEG monitoring completed and restraints were taken off.  EEG did not show any epileptiform discharges or seizures.    Ronak Rosa MD  Neurology Resident PGY- 4  [facility]

## 2025-02-11 NOTE — PROGRESS NOTES
02/11/25 1000   Appointment Info   Signing Clinician's Name / Credentials (OT) KALYAN Doyle   Student Supervision Direct supervision provided       Present yes  (Language Line via Ipad)   Language Hmong   Living Environment   People in Home facility resident   Current Living Arrangements other (see comments)  (previously was at TCU unit at [facility])   Transportation Anticipated family or friend will provide   Living Environment Comments Per medical chart review and per patient wife report, pt recently re-admitted from TCU at [facility] where pt was receiving skilled OT/PT services. Per pt wife report, once pt is able to discharge home can remain on main level where bedroom and bathroom are located.   Self-Care   Usual Activity Tolerance fair   Current Activity Tolerance poor   Regular Exercise No   Fall history within last six months yes   Number of times patient has fallen within last six months 1  (Pt spouse reports has fallen OOB at TCU)   Activity/Exercise/Self-Care Comment Pt poor historian this date due to cognitive status and per , patient is aphasic, difficult to understand. Per patient wife report, patient was receiving assist for all ADL tasks and requiring OH lift for all functional transfers at TCU.   Instrumental Activities of Daily Living (IADL)   IADL Comments Pt receiving assist for all IADL tasks at TCU facility and from patient wife.   General Information   Onset of Illness/Injury or Date of Surgery 02/09/25   Referring Physician Landon Prado MD   Patient/Family Therapy Goal Statement (OT) Return to TCU and eventually return to home   Additional Occupational Profile Info/Pertinent History of Current Problem Per EMR, Gabriel Colorado is a 64 year old male with a past medical history of ischemic stroke, recent fall, type 2 diabetes, chronic constipation, and sensorineural hearing loss, who presents to the ED for evaluation of altered  mental status.  By chart review the patient has previously had a MCA stroke in December was seen on imaging at an outside hospital, followed by repeat imaging in January that showed worsening progression of stroke.  He has had persistent deficits since that time with hemiplegia and aphasia, and currently living in a group home.  According to the group home, the patient developed new onset altered mental status today and is completely unresponsive, last normal yesterday sometime before bed.  EMS was called and brought the patient in stating that his blood glucose was 120, and he was not responsive for them at all in route.  Patient is unable to provide history on arrival as he is unresponsive.   Left Upper Extremity (Weight-bearing Status) full weight-bearing (FWB)   Right Upper Extremity (Weight-bearing Status) full weight-bearing (FWB)   Left Lower Extremity (Weight-bearing Status) full weight-bearing (FWB)   Right Lower Extremity (Weight-bearing Status) full weight-bearing (FWB)   General Observations and Info Activity: up with assist   Cognitive Status Examination   Orientation Status not oriented to person, place or time   Cognitive Status Comments Pt presents with increased alertness compared to yesterday's date with wife present in room however requires L UE restraint for line -pulling and per , 'pt is mumbling,' presents aphasic. Will intermittently follow ~5% of 1-step commands when given gestural and visual cues.   Visual Perception   Impact of Vision Impairment on Function (Vision) Did not formally assess this date, will monitor   Sensory   Sensory Comments Did not formally assess this date, will monitor   Pain Assessment   Patient Currently in Pain No   Posture   Posture forward head position   Posture Comments while seated EOB, able to intermittently self-correct   Range of Motion Comprehensive   Comment, General Range of Motion L UE AROM WFL; R UE appears with significantly reduced AROM due  to baseline flaccidity   Strength Comprehensive (MMT)   Comment, General Manual Muscle Testing (MMT) Assessment Not formally assessed this date, however R UE strength significantly reduced from prior CVA, presents flaccid   Muscle Tone Assessment   Muscle Tone Quick Adds RUE   Muscle Tone Assessment - RUE flaccid   Coordination   Coordination Comments L UE appears WFL during functional reaching however R UE significantly reduced GMC and FMC at baseline due to prior CVA   Bed Mobility   Bed Mobility supine-sit   Supine-Sit [place] (Bed Mobility) maximum assist (25% patient effort);2 person assist;dependent (less than 25% patient effort)   Comment (Bed Mobility) per clinical judgement   Transfers   Transfers sit-stand transfer;toilet transfer   Sit-Stand Transfer   Sit-Stand [place] (Transfers) dependent (less than 25% patient effort)   Sit/Stand Transfer Comments per clinical judgement   Toilet Transfer   Type (Toilet Transfer) sit-stand   [place] Level (Toilet Transfer) maximum assist (25% patient effort);2 person assist;dependent (less than 25% patient effort)   Toilet Transfer Comments per clinical judgement   Balance   Balance Assessment sitting static balance   Balance Comments mostly min A throughout, intermittent bouts of SBA seated at EOB   Activities of Daily Living   BADL Assessment/Intervention bathing;upper body dressing;lower body dressing;grooming;toileting   Bathing Assessment/Intervention   [place] Level (Bathing) maximum assist (25% patient effort);dependent (less than 25% patient effort)   Comment, (Bathing) per clinical judgement   Upper Body Dressing Assessment/Training   Comment, (Upper Body Dressing) per clinical judgement   [place] Level (Upper Body Dressing) maximum assist (25% patient effort);dependent (less than 25% patient effort)   Lower Body Dressing Assessment/Training   Comment, (Lower Body Dressing) per clinical judgement   [place] Level (Lower Body  Dressing) dependent (less than 25% patient effort)   Grooming Assessment/Training   [place] Level (Grooming) moderate assist (50% patient effort);verbal cues;set up   Comment, (Grooming) per clinical judgement   Toileting   Comment, (Toileting) per clinical judgement   [place] Level (Toileting) dependent (less than 25% patient effort)   Clinical Impression   Criteria for Skilled Therapeutic Interventions Met (OT) Yes, treatment indicated   OT Diagnosis Decreased cognition, ADL tasks, command-following, activity tolerance, functional mobility, neuro re-ed   OT Problem List-Impairments impacting ADL problems related to;balance;cognition;communication;inability to direct their own care;mobility;strength;postural control;activity tolerance impaired;coordination;motor control;range of motion (ROM);muscle tone;sensory feedback   Assessment of Occupational Performance 5 or more Performance Deficits   Identified Performance Deficits dressing, bathing, toileting, functional mobility, cognition, IADL tasks, neuro re-ed   Planned Therapy Interventions (OT) ADL retraining;bed mobility training;balance training;cognition;strengthening;transfer training;progressive activity/exercise;motor coordination training;neuromuscular re-education;ROM   Clinical Decision Making Complexity (OT) problem focused assessment/low complexity   Risk & Benefits of therapy have been explained evaluation/treatment results reviewed;participants included;patient;spouse/significant other  ( used)   Clinical Impression Comments Pt below baseline function and will benefit from continued skilled OT during IP stay to progress IND/safety and return to PLOF   OT Total Evaluation Time   OT Eval, Low Complexity Minutes (18604) 8   OT Goals   Therapy Frequency (OT) 6 times/week   OT Predicted Duration/Target Date for Goal Attainment 03/11/25   OT Goals Hygiene/Grooming;Upper Body Dressing;Lower Body Dressing;Upper Body Bathing;Lower Body  Bathing;Bed Mobility;Transfers;Toilet Transfer/Toileting;Cognition   OT: Hygiene/Grooming minimal assist   OT: Upper Body Dressing Minimal assist   OT: Lower Body Dressing Minimal assist;using adaptive equipment   OT: Upper Body Bathing Minimal assist;using adaptive equipment   OT: Lower Body Bathing Minimal assist;using adaptive equipment   OT: Bed Mobility Minimal assist;supine to/from sitting   OT: Toilet Transfer/Toileting Minimal assist;toilet transfer;cleaning and garment management;using adaptive equipment   OT: Cognitive Patient/caregiver will verbalize understanding of cognitive assessment results/recommendations as needed for safe discharge planning   Interventions   Interventions Quick Adds Self-Care/Home Management;Therapeutic Activity;Therapeutic Procedures/Exercise;Cognitive Retraining   OT Discharge Planning   OT Plan OT: monitor cog, sitting balance EOB, R-side neuro re-ed, pivot/STS transfers to L-side   OT Discharge Recommendation (DC Rec) Transitional Care Facility   OT Rationale for DC Rec Pt below baseline function for ADL tasks, functional transfers, overall activity tolerance, and cognition. Per medical chart review, patient previously residing at [facility] at their Transition Care Facility where he was receiving skilled OT/PT services. Per patient wife report, pt required OH lift for functional transfers at TCU and ultimate end goal is to be able to take patient home and be trained on how to caregive for patient. At this time, recommend patient return to TCU unit at SNF to progress ADL IND/safety, cognition, functional transfers, and overall caregiver training.   OT Brief overview of current status OH lift (will need Broda chair if remaining up in chair, falls risk), min A EOB sitting balance.   OT Total Distance Amb During Session (feet) 0   Total Session Time   Timed Code Treatment Minutes 45

## 2025-02-11 NOTE — PLAN OF CARE
6A Defer Physical Therapy - Per chart review and discussion with Occupational Therapy and SW, pt from LTC with plans to return this date. Pt with no skilled inpatient PT needs, Will complete consult and defer evaluation, please reconsult as appropriate if patient has decline in functional mobility requiring further skilled inpatient PT needs. Defer Discharge recommendations to OT and SW team.

## 2025-02-11 NOTE — PLAN OF CARE
Goal Outcome Evaluation:      Plan of Care Reviewed With: patient, family    Overall Patient Progress: no changeOverall Patient Progress: no change    Temp: 98.4  F (36.9  C) Temp src: Oral BP: (!) 150/75 Pulse: 88   Resp: 16 SpO2: 96 % O2 Device: None (Room air)      Vitals: HTN 's.   Neuros: Garbled incoherent speech, aphasic per wife and  services. Hmong speaking. Right side 1/5. LUE 5/5. LLE 4/5. Decreased sensation right side. MORGAN orientation.   IV: Removed  Labs/Electrolytes: Phos replaced per protocol. VEEG discontinued   Resp: RA.  Diet: Regular diet. Appetite poor   GI: BM today  : Incontinent of urine and stool .  Skin: Old abdominal surgical scaring. Right leg redness to knee and shin.  Pain: No s/sx pain.  Activity: Turns with 2 assist. Up with ceiling lift  Plan to return back to long term care at [facility] today

## 2025-02-11 NOTE — PLAN OF CARE
Goal Outcome Evaluation:      Plan of Care Reviewed With: patient, family    Overall Patient Progress: no changeOverall Patient Progress: no change       Called report to [facility] at 1400. Family is aware that Pt is discharging today.

## 2025-02-11 NOTE — PROGRESS NOTES
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   General Observations Upon SLP arrival, pt laying in bed alert with SO and daughter present. Pt on RA, with L wrist restraint in place. Family declined  services and daughter interpreted for eval.       Present no  (Family declined; daughter interpreted.)   Language Hmong   Pain Assessment   Patient Currently in Pain No   Type of Evaluation   Type of Evaluation Swallow Evaluation   Oral Motor   Oral Musculature anomalies present   Structural Abnormalities none present   Mucosal Quality adequate   Oral Motor Deficits Observed Facial Symmetry (Oral Motor) (Group);Lip Function (Oral Motor) (Group)   Dentition (Oral Motor)   Dentition (Oral Motor) natural dentition   Facial Symmetry (Oral Motor)   Facial Symmetry (Oral Motor) right side impairment   Right Side Facial Asymmetry moderate impairment   Lip Function (Oral Motor)   Lip Range of Motion (Oral Motor) retraction impairment   Retraction, Lip Range of Motion right side;moderate impairment   Tongue Function (Oral Motor)   Tongue Strength (Oral Motor) unable/difficult to assess   Tongue Coordination/Speed (Oral " Motor) reduced rate   Tongue ROM (Oral Motor) elevation is impaired   Elevation, Tongue ROM Impairment (Oral Motor) minimal impairment   Cough/Swallow/Gag Reflex (Oral Motor)   Volitional Throat Clear/Cough (Oral Motor) reduced strength   Vocal Quality/Secretion Management (Oral Motor)   Vocal Quality (Oral Motor) WFL   Secretion Management (Oral Motor) WNL   General Swallowing Observations   Past History of Dysphagia Pt familiar to SLP services for aphasia and dysphagia 2/2 acute ischemic stroke 12/12/2024. Pt discharged 1/6/25 to TCU on puree diet (IDDSI 4) and mildly thick (IDDSI 2) liquids. Per family report, pt continued on this diet for a couple weeks before being upgraded to a regular diet with thin liquids.   Respiratory Support room air   Current Diet/Method of Nutritional Intake (General Swallowing Observations, NIS) regular diet;thin liquids (level 0)   Swallowing Evaluation Clinical swallow evaluation   Clinical Swallow Evaluation   Feeding Assistance dependent   Additional evaluation(s) completed today No   Clinical Swallow Evaluation Textures Trialed thin liquids;solid foods   Clinical Swallow Eval: Thin Liquid Texture Trial   Mode of Presentation, Thin Liquids cup;straw;fed by clinician   Volume of Liquid or Food Presented 4 oz   Oral Phase of Swallow WFL   Pharyngeal Phase of Swallow impaired;coughing/choking   Diagnostic Statement Pt w/ overt s/sx of aspiration x1 following initial sequential sips by straw, appreciated immediate coughing. No other overt s/sx of aspiration across remainder of trials including during repeat trial of sequential straw sips.   Clinical Swallow Evaluation: Solid Food Texture Trial   Mode of Presentation fed by clinician   Volume Presented 1 bite omelet   Oral Phase impaired mastication;residue in oral cavity   Oral Residue right lip drooling;mid posterior tongue;right anterior lateral sulci   Pharyngeal Phase intact   Diagnostic Statement No overt s/sx of aspiration with  solid. Appreciated mild-moderate oral residue, pt able to clear with liquid wash x1. Per family, this is baseline for the patient.   Esophageal Phase of Swallow   Patient reports or presents with symptoms of esophageal dysphagia No   Swallowing Recommendations   Diet Consistency Recommendations regular diet;thin liquids (level 0)   Supervision Level for Intake 1:1 supervision needed   Mode of Delivery Recommendations bolus size, small;place food on left side of mouth;slow rate of intake   Swallowing Maneuver Recommendations alternate food and liquid intake   Monitoring/Assistance Required (Eating/Swallowing) check mouth frequently for oral residue/pocketing;cue for finger/lingual sweep if oral pocketing present;stop eating activities when fatigue is present;monitor for cough or change in vocal quality with intake   Recommended Feeding/Eating Techniques (Swallow Eval) maintain upright sitting position for eating;minimize distractions during oral intake;provide assist with feeding;set-up and prepare tray;provide oral hygiene prior to intake   Medication Administration Recommendations, Swallowing (SLP) Meds OK as tolerated.   Clinical Impression   Criteria for Skilled Therapeutic Interventions Met (SLP Eval) Evaluation only;Current level of function same as previous level of function   SLP Diagnosis Mild oropharyngeal dysphagia consistent with baseline swallow   Risks & Benefits of therapy have been explained evaluation/treatment results reviewed;care plan/treatment goals reviewed;risks/benefits reviewed;current/potential barriers reviewed;participants voiced agreement with care plan;participants included;patient;spouse/significant other;daughter   Clinical Impression Comments Clinical swallow evaluation completed per MD orders. Pt presents with mild oropharyngeal dysphagia that is consistent with baseline swallow per chart review and family report, s/p L MCA CVA in 12/24. Per chart review and family report, pt is  aphasiac and at baseline for language abilities. Pt's family reports prior to hospital admission pt has been tolerating a regular diet with thin liquids. Oral mech remarkable for moderate R facial droop, R lip retraction impairment, impaired tongue elevation, reduced tongue coordination rate, and reduced strength cough. Pt assessed with thin liquids and solids, patient declined puree trials. Oral phase remarkable for prolonged but functional mastication, R lip drooling, and mild-moderate oral residue that cleared with liquid wash. Pharyngeal phase remarkable for overt s/sx of aspiration x1 on initial sequential sips by straw, however on second trial of sequential sips no overt s/sx of aspiration noted. No other overt s/sx of aspiration noted across all other trials. Pt was impulsive and attempted several large sips despite verbal cues. Pt declined additional solid trials. Recommend continue regular diet with thin liquids, with 1:1 supervision and feeding assistance. Meds OK as tolerated. Pt should be sitting fully upright, alternating food/liquids, taking small bites/sips, and at a slow rate. Caregivers should check frequently for oral pocketing and complete finger swipe/liquid wash as appropriate to clear. Given patient is at baseline status per family, no further SLP services indicated at this time. SLP will S/O.   SLP Total Evaluation Time   Eval: oral/pharyngeal swallow function, clinical swallow Minutes [id number]) 04

## 2025-02-11 NOTE — PROGRESS NOTES
Left wrist restraint removed at 1340. Pt plan to  discharge to [facility] ([phone]) between 2:40pm - 3:25pm today

## 2025-02-11 NOTE — PLAN OF CARE
Goal Outcome Evaluation:      Plan of Care Reviewed With: patient    Overall Patient Progress: no changeOverall Patient Progress: no change     Status: PMHx of HTN, DM2, history of segmental sigmoidectomy, left M1 occlusion s/p TICI 2a recannnalization December 2024 with residual right sided hemibody weakness and aphasia. Admitted for AMS, overnight EEG monitoring and continued toxic-metabolic workup.  Vitals: VSS CCM NSR. On RA.  Neuros: Garbled incoherent speech, aphasic per wife and  services. Hmong speaking. Right side 1/5. LUE 5/5. LLE 4/5. Decreased sensation right side. NIH 15-16. MORGAN orientation. Follows some simple commands. LUE restrained to prevent patient from pulling off VEEG.   IV: PIV SL  Labs/Electrolytes: AM draws. VEEG study running.  Resp: On RA. LS dim. Occasional congested cough.  Diet: Regular diet.  GI:  PTA BM  : Incontinent of urine. Bladder scan 0ml PVR.  Skin: Old abdominal surgical scaring. Right leg redness to knee and shin.  Pain: No s/sx pain.  Activity: Up 2/lift. T/R q2h.  Plan: EEG study running. PT/OT ans SLP ethan.

## 2025-02-11 NOTE — PLAN OF CARE
Goal Outcome Evaluation:    Pt discharged back to [facility] with [facility] Transport via stretcher at 1600. Discharge packet sent with EMS staff     Pt left belongings behind- blue sweat pant and teal green T shirt. Hospital  arranged at 1720 to bring Pt's belongings to [facility]    alert

## 2025-02-11 NOTE — PLAN OF CARE
[id number]     Upon start of shift patient unresponsive, not following commands, minimal contraction in extremities. Patient GF arrived around 1700 and patient began talking, following commands, moving his left side purposefully. Hmong speaking. Patient passed bedside swallow and diet switched to regular. List of food preferences listed on whiteboard per GF. Incontinent of BM x1. Voiding via urinal. PIV TKO. K+ replaced. Up with lift. VEEG in place.      See charting for complete assessments

## 2025-02-11 NOTE — DISCHARGE SUMMARY
General acute hospital  NEUROLOGY DISCHARGE SUMMARY    Patient Name:  Gabriel Colorado  MRN:  [id number]      :  1960      Date of Admission:  2025  Date of Discharge:  2025  Admitting Physician:  Fernandez Live MD  Discharge Physician:  Nimesh Mobley MD  Primary Care Provider:   Janell Mcgill  Discharge Disposition:   Discharged to nursing home    Admission Diagnoses:  #AMS  #stable to improving M1 occlusion s/p TICI 2a recannalization Dec 2024  #HTN   #DM2   #HLD   #GERD   #history of segmental sigmoidectomy with hartmanns pouch s/p colostomy reversal in      Discharge Diagnoses:    #AMS, was s found to be behavioral in nature.  #stable to improving M1 occlusion s/p TICI 2a recannalization Dec 2024  #HTN   #DM2   #HLD   #GERD   #history of segmental sigmoidectomy with hartmanns pouch s/p colostomy reversal in      Brief History of Illness:   Gabriel Colorado is a 64 year old male with PMHx of HTN, DM2, history of segmental sigmoidectomy with hartmanns pouch s/p colostomy reversal in , left M1 occlusion s/p TICI 2a recannnalization 2024 with residual right sided hemibody weakness and aphasia presenting for evaluation of AMS. Patient sent to the ED by EMS this afternoon after being noticed to be less responsive to conversation and stimulation at home. LKW is yesterday afternoon/evening, before patient went to sleep for the night. Per chart review and d/w with daughter John, at his baseline, he is bedbound, aphasic with nonsensical speech, but can follow commands and use his left side. He was admitted Dec/2024 for L M1 occlusion s/p TICI 2a embolectomy and discharged on DAPT for 90 days.      On presentation to the ED, pt was not responding to speech, but withdrawing to pain in all 3 extremities. Resting comfortably in the bed. /98, glucose 138. We were unable to reach the group home for further collateral.     Hospital Course:  Upon arrival,  patient was minimally responsive. Electrolytes were WNL. MRI brain did not show any evidence of an acute infarct. He was admitted for further evaluation. Toxic/metabolic workup negative. Infectious workup largely negative. psychologic / behavioral was also in the differential given he does actively close eyes against force on exam and report from SNF that he at times chooses not to participate with exam. Given his history there is concern for seizure but initial EEG evaluation without evidence of epileptiform activity.  EEG was completed on the morning of 2/11/2025 which did not show any epileptiform discharges or seizures.  Patient does have a bit of slowing in the left hemisphere likely secondary to his strokes.  On the evening of  2/20/2024 the patient's significant other came to visit him in the hospital and patient was very interactive and was ready to eat food and drink water.  On asking more about the patient's situation with the significant other's daughter, she endorsed that the patient was angry that the significant other was not able to stay overnight on Friday and the patient's facility.  And the patient does have these behaviors where he will choose to ignore everyone and not do anything when he is in such a mood.  After the significant other way to the hospital I was able to do a bedside swallow and the patient was able to swallow without any difficulty and finished a whole cup of water and wanted to have food which the significant other had brought for him.  As per significant other and her daughter patient was at his baseline.  Hence we came to the conclusion that the event that the patient presented to the hospital was most likely behavioral as we could not find any any other abnormalities in her testing while he was inpatient.  Hence the patient is being discharged to his previous facility where he already has a bed in a stable condition.    Pertinent Investigations:    MR Brain w/wo  "contrast:  Impression:  1. No definite acute infarct demonstrated on diffusion weighted  imaging within the ischemic territory demonstrated on earlier same day  perfusion imaging. Given the left MCA distribution parenchymal volume  loss and vessel collateralization, findings are suggestive of chronic  left MCA occlusion.   2. Postcontrast enhancement through the dorsal left basal ganglia,  corona radiata and centrum semiovale in the regions of previously  described acute infarcts on the outside report dated 12/13/2024,  compatible with now late subacute to chronic infarct.  3. Sequela of chronic small vessel ischemic disease.    EEG-  IMPRESSION OF VIDEO EEG DAY # 1: This video EEG is abnormal due to the presence of predominantly theta slowing over the left hemisphere and prominent delta slowing in the left hemisphere, consistent with mild to moderate diffuse encephalopathy and additional structural abnormality over the left hemisphere, consistent with the patient's history of left MCA stroke.  No epileptiform discharges or electrographic seizures were recorded.  Clinical correlation is advised.     Consultations:     SLP for swallow-patient passed with recommendations for regular diet thin liquids    Recommendations and Follow-up:  Follow-up with PCP on outpatient basis in 3 to 4 weeks.    Discharge physical examination:   BP (!) 169/91 (BP Location: Left arm)   Pulse 93   Temp 98.4  F (36.9  C) (Oral)   Resp 16   Ht 1.575 m (5' 2\")   Wt 64.4 kg (141 lb 15.6 oz)   SpO2 99%   BMI 25.97 kg/m    General: No acute distress, patient is laying in bed.  HEENT: NC/AT.  Cardiac: RRR  Chest: No increased work of breathing  Abdomen: Soft  Extremities: No edema  Skin: No rashes or lesions on  Psych: During first 2 days of admission patient would not respond to any visual vocal, tactile stimulus and would not follow any commands or move any extremities.  But after patient's significant other was able to come visiting the " patient was very interactive and was able to follow her commands and act appropriately and at his baseline.    Neuro:  Mental status: Awake and alert, aphasic but able to follow commands that the patient's significant other gives him.  Cranial nerves: Pupils equal and reactive, extraocular movements intact, visual fields intact to threat, no gross facial symmetry noticed, facial sensation intact, tongue midline.  Motor: Normal bulk, increased tone in right upper and lower extremity.  Right upper and lower extremity weakness which drifts and hits bed, left upper and lower extremity antigravity and 5 out of 5 strength.  Reflexes: Hyperreflexic in the right upper and lower extremities with an upgoing toe on the right, normoreflexic on the left downgoing toes  Sensory: Intact to noxious stimulus in all 4 extremities  Coordination: Unable to assess due to aphasia  Gait: Patient is plegic on the right and is nonambulatory at baseline after stroke in December 2024.    Discharge Medications:  Current Discharge Medication List        CONTINUE these medications which have CHANGED    Details   amLODIPine-benazepril (LOTREL) 5-40 MG capsule Take 1 capsule by mouth daily.  Qty: 30 capsule, Refills: 0    Associated Diagnoses: Essential hypertension           CONTINUE these medications which have NOT CHANGED    Details   acetaminophen (TYLENOL) 325 MG tablet Take 650 mg by mouth 3 times daily.      amantadine (SYMMETREL) 100 MG capsule Take 100 mg by mouth daily.      aspirin 81 MG EC tablet Take 81 mg by mouth every evening.      atorvastatin (LIPITOR) 80 MG tablet Take 80 mg by mouth every evening.      cetirizine (ZYRTEC) 10 MG tablet [CETIRIZINE (ZYRTEC) 10 MG TABLET] Take 10 mg by mouth daily.      clopidogrel (PLAVIX) 75 MG tablet Take 75 mg by mouth daily.      dapagliflozin (FARXIGA) 10 MG TABS tablet Take 10 mg by mouth daily.      donepezil (ARICEPT) 5 MG tablet Take 5 mg by mouth every evening.      famotidine (PEPCID)  40 MG tablet Take 40 mg by mouth every evening.      FLUoxetine (PROZAC) 20 MG capsule Take 20 mg by mouth daily.      insulin glargine (LANTUS PEN) 100 UNIT/ML pen Inject 12 Units subcutaneously every evening.      lisinopril (ZESTRIL) 5 MG tablet Take 5 mg by mouth daily.      metFORMIN (GLUCOPHAGE) 1000 MG tablet Take 1,000 mg by mouth 2 times daily (with meals).      traZODone (DESYREL) 50 MG tablet Take 50 mg by mouth every evening.           STOP taking these medications       ezetimibe (ZETIA) 10 MG tablet Comments:   Reason for Stopping:               Discharge follow up and instructions:     General info for SNF    Length of Stay Estimate: Long Term Care  Condition at Discharge: Stable  Level of care:skilled   Rehabilitation Potential: Good  Admission H&P remains valid and up-to-date: Yes  Recent Chemotherapy: N/A  Use Nursing Home Standing Orders: Yes     Mantoux instructions    Give two-step Mantoux (PPD) Per Facility Policy Yes     Follow Up and recommended labs and tests    Follow up with primary care provider in 3 to 4 weeks.  No follow up labs or test are needed.     Reason for your hospital stay    You are admitted for altered mental status.     Activity - Up with nursing assistance     Fall precautions     Diet    Follow this diet upon discharge: Current Diet:Orders Placed This Encounter      Regular Diet Adult       Patient seen and discussed with KOREY Sprague MD Abhiram Parameswaran Pillai, MD  Neurology Resident PGY- 4  [facility]

## 2025-02-11 NOTE — PROGRESS NOTES
"Care Management Discharge Note    Discharge Date: 02/11/2025       Discharge Disposition:  [facility] ([phone])    Discharge Services:  Return to long term care at [facility]    Discharge DME:    Not applicable at this time    Discharge Transportation: SHREYA spoke with pt's floor nurse (Cynthia) who states that pt will need stretcher transport to the receiving facility as pt's right side is out and pt does not have adequate trunk control to maintain an upright seated position.    SHREYA arranged for [facility] ([phone]) to provide stretcher transport with a service window  time of 2:40pm - 3:25pm.    Private pay costs discussed:   Not applicable at this time    Does the patient's insurance plan have a 3 day qualifying hospital stay waiver?  No    PAS Confirmation Code:    Not required as pt is returning to SNF of origin where bed was held  Patient/family educated on Medicare website which has current facility and service quality ratings:  No as pt is returning to SNF or origin where bed was held    Education Provided on the Discharge Plan:  Yes  Persons Notified of Discharge Plans:  Pt's son (Musa), 6A nursing and Dr. Rosa  Patient/Family in Agreement with the Plan:   Yes    Handoff Referral Completed: Yes, non-[facility] PCP: External handoff communication completed    Additional Information:  - Dr. Rosa confirmed readiness for discharge  - SHREYA phoned Admissions (Zuly) at [facility] who confirmed that pt's bed was held, that pt is a long term care pt at the facility and who confirmed acceptance for re-admit  - SHREYA faxed pt's completed discharge orders and discharge summary to [facility]  - SW completed (with [employer]  on the phone) \"Important Message from Medicare\" with pt's son (Musa) via phone call  - SHREYA completed \"Physician's Certification for Transportation \" on line form  - SHREYA has informed pt's floor nurse (Cynthia) that [facility] Home would like RN to RN report to [phone].    PEYTON Guadalupe  Social Work, 6A  Phone:  [phone]  Pager:  [phone]  2/11/2025       JAEL MeridaW

## 2025-02-12 ENCOUNTER — PATIENT OUTREACH (OUTPATIENT)
Dept: CARE COORDINATION | Facility: CLINIC | Age: 65
End: 2025-02-12
Payer: MEDICARE

## 2025-02-12 NOTE — PLAN OF CARE
Occupational Therapy Discharge Summary    Reason for therapy discharge:    Discharged to long term care facility.    Progress towards therapy goal(s). See goals on Care Plan in [facility] electronic health record for goal details.  Goals partially met.  Barriers to achieving goals:   discharge from facility.    Therapy recommendation(s):    Continued therapy is recommended.  Rationale/Recommendations:  Recommending continued skilled OT at LTC Facility to progress ADL IND/safety, neuro re-education, overall activity tolerance.

## 2025-02-12 NOTE — PROGRESS NOTES
Connected Care Resource Center    Background: Transitional Care Management program identified per system criteria and reviewed by [facility] team for possible outreach.    Assessment: Upon chart review, [facility] Team member will not proceed with patient outreach related to this episode of Transitional Care Management program due to reason below:    Patient has discharged to a Memory Care, Long-term Care, Assisted Living or Group Home where patient is receiving on-site support with their daily cares, including support with hospital follow up plan.    Plan: Transitional Care Management episode addressed appropriately per reason noted above.      Saadia Eddy MA  Connected Care Resource [place], [facility]    *Connected Care Resource Team does NOT follow patient ongoing. Referrals are identified based on internal discharge reports and the outreach is to ensure patient has an understanding of their discharge instructions.

## 2025-02-13 ENCOUNTER — DOCUMENTATION ONLY (OUTPATIENT)
Dept: OTHER | Facility: CLINIC | Age: 65
End: 2025-02-13
Payer: MEDICARE